# Patient Record
Sex: FEMALE | Race: WHITE | Employment: FULL TIME | URBAN - METROPOLITAN AREA
[De-identification: names, ages, dates, MRNs, and addresses within clinical notes are randomized per-mention and may not be internally consistent; named-entity substitution may affect disease eponyms.]

---

## 2017-02-17 ENCOUNTER — OFFICE VISIT (OUTPATIENT)
Dept: FAMILY MEDICINE CLINIC | Age: 62
End: 2017-02-17

## 2017-02-17 VITALS
HEART RATE: 60 BPM | RESPIRATION RATE: 16 BRPM | TEMPERATURE: 97.8 F | SYSTOLIC BLOOD PRESSURE: 110 MMHG | BODY MASS INDEX: 26.61 KG/M2 | DIASTOLIC BLOOD PRESSURE: 70 MMHG | HEIGHT: 62 IN | WEIGHT: 144.6 LBS

## 2017-02-17 DIAGNOSIS — Z11.59 NEED FOR HEPATITIS C SCREENING TEST: ICD-10-CM

## 2017-02-17 DIAGNOSIS — B00.9 HERPES SIMPLEX: ICD-10-CM

## 2017-02-17 DIAGNOSIS — Z13.29 THYROID DISORDER SCREEN: ICD-10-CM

## 2017-02-17 DIAGNOSIS — Z11.4 SCREENING FOR HIV (HUMAN IMMUNODEFICIENCY VIRUS): ICD-10-CM

## 2017-02-17 DIAGNOSIS — H91.93 BILATERAL HEARING LOSS: ICD-10-CM

## 2017-02-17 DIAGNOSIS — E53.8 VITAMIN B12 DEFICIENCY: ICD-10-CM

## 2017-02-17 DIAGNOSIS — Z13.220 LIPID SCREENING: ICD-10-CM

## 2017-02-17 DIAGNOSIS — J30.1 SEASONAL ALLERGIC RHINITIS DUE TO POLLEN: ICD-10-CM

## 2017-02-17 DIAGNOSIS — Z13.1 DIABETES MELLITUS SCREENING: ICD-10-CM

## 2017-02-17 DIAGNOSIS — Z00.00 WELL ADULT EXAM: Primary | ICD-10-CM

## 2017-02-17 DIAGNOSIS — L71.9 ROSACEA: ICD-10-CM

## 2017-02-17 LAB
A/G RATIO: 2.1 (ref 1.1–2.2)
ALBUMIN SERPL-MCNC: 4.9 G/DL (ref 3.4–5)
ALP BLD-CCNC: 50 U/L (ref 40–129)
ALT SERPL-CCNC: 17 U/L (ref 10–40)
ANION GAP SERPL CALCULATED.3IONS-SCNC: 13 MMOL/L (ref 3–16)
AST SERPL-CCNC: 18 U/L (ref 15–37)
BILIRUB SERPL-MCNC: 0.6 MG/DL (ref 0–1)
BUN BLDV-MCNC: 16 MG/DL (ref 7–20)
CALCIUM SERPL-MCNC: 9.6 MG/DL (ref 8.3–10.6)
CHLORIDE BLD-SCNC: 99 MMOL/L (ref 99–110)
CHOLESTEROL, TOTAL: 177 MG/DL (ref 0–199)
CO2: 25 MMOL/L (ref 21–32)
CREAT SERPL-MCNC: 0.7 MG/DL (ref 0.6–1.2)
GFR AFRICAN AMERICAN: >60
GFR NON-AFRICAN AMERICAN: >60
GLOBULIN: 2.3 G/DL
GLUCOSE BLD-MCNC: 96 MG/DL (ref 70–99)
HDLC SERPL-MCNC: 73 MG/DL (ref 40–60)
HEPATITIS C ANTIBODY INTERPRETATION: NORMAL
LDL CHOLESTEROL CALCULATED: 94 MG/DL
POTASSIUM SERPL-SCNC: 4.4 MMOL/L (ref 3.5–5.1)
SODIUM BLD-SCNC: 137 MMOL/L (ref 136–145)
TOTAL PROTEIN: 7.2 G/DL (ref 6.4–8.2)
TRIGL SERPL-MCNC: 49 MG/DL (ref 0–150)
TSH REFLEX: 1.53 UIU/ML (ref 0.27–4.2)
VITAMIN B-12: 445 PG/ML (ref 211–911)
VLDLC SERPL CALC-MCNC: 10 MG/DL

## 2017-02-17 PROCEDURE — 99396 PREV VISIT EST AGE 40-64: CPT | Performed by: FAMILY MEDICINE

## 2017-02-17 RX ORDER — MONTELUKAST SODIUM 10 MG/1
TABLET ORAL
Qty: 30 TABLET | Refills: 5 | Status: SHIPPED | OUTPATIENT
Start: 2017-02-17 | End: 2018-06-12

## 2017-02-17 RX ORDER — FEXOFENADINE HCL AND PSEUDOEPHEDRINE HCI 180; 240 MG/1; MG/1
TABLET, EXTENDED RELEASE ORAL
Qty: 30 TABLET | Refills: 12 | Status: SHIPPED | OUTPATIENT
Start: 2017-02-17 | End: 2017-04-04

## 2017-02-17 RX ORDER — M-VIT,TX,IRON,MINS/CALC/FOLIC 27MG-0.4MG
1 TABLET ORAL DAILY
COMMUNITY

## 2017-02-17 RX ORDER — DOXYCYCLINE HYCLATE 50 MG/1
50 CAPSULE ORAL DAILY
COMMUNITY
End: 2017-02-17 | Stop reason: SDUPTHER

## 2017-02-17 RX ORDER — MEDROXYPROGESTERONE ACETATE 2.5 MG/1
2.5 TABLET ORAL DAILY
COMMUNITY
End: 2020-09-23

## 2017-02-17 RX ORDER — DIAZEPAM 5 MG/1
5 TABLET ORAL PRN
COMMUNITY
End: 2018-02-05 | Stop reason: SDUPTHER

## 2017-02-17 RX ORDER — ACYCLOVIR 400 MG/1
TABLET ORAL
Qty: 30 TABLET | Refills: 12 | Status: SHIPPED | OUTPATIENT
Start: 2017-02-17 | End: 2018-02-05 | Stop reason: SDUPTHER

## 2017-02-17 RX ORDER — DOXYCYCLINE HYCLATE 50 MG/1
50 CAPSULE ORAL DAILY
Qty: 30 CAPSULE | Refills: 12 | Status: SHIPPED | OUTPATIENT
Start: 2017-02-17 | End: 2018-03-11 | Stop reason: SDUPTHER

## 2017-02-17 ASSESSMENT — ENCOUNTER SYMPTOMS
VOICE CHANGE: 0
CHOKING: 0
BLOOD IN STOOL: 0
WHEEZING: 0
CHEST TIGHTNESS: 0
DIARRHEA: 0
ABDOMINAL PAIN: 0
SHORTNESS OF BREATH: 0
ANAL BLEEDING: 0
NAUSEA: 0
CONSTIPATION: 0
SORE THROAT: 0
TROUBLE SWALLOWING: 0

## 2017-02-20 LAB — HIV-1 AND HIV-2 ANTIBODIES: NORMAL

## 2017-09-06 ENCOUNTER — OFFICE VISIT (OUTPATIENT)
Dept: FAMILY MEDICINE CLINIC | Age: 62
End: 2017-09-06

## 2017-09-06 VITALS
BODY MASS INDEX: 26.67 KG/M2 | HEART RATE: 74 BPM | OXYGEN SATURATION: 99 % | SYSTOLIC BLOOD PRESSURE: 129 MMHG | TEMPERATURE: 98.1 F | WEIGHT: 147 LBS | DIASTOLIC BLOOD PRESSURE: 82 MMHG | RESPIRATION RATE: 16 BRPM

## 2017-09-06 DIAGNOSIS — J02.9 SORE THROAT: Primary | ICD-10-CM

## 2017-09-06 DIAGNOSIS — J06.9 VIRAL URI: ICD-10-CM

## 2017-09-06 LAB — S PYO AG THROAT QL: NORMAL

## 2017-09-06 PROCEDURE — 1036F TOBACCO NON-USER: CPT | Performed by: FAMILY MEDICINE

## 2017-09-06 PROCEDURE — 87880 STREP A ASSAY W/OPTIC: CPT | Performed by: FAMILY MEDICINE

## 2017-09-06 PROCEDURE — 99213 OFFICE O/P EST LOW 20 MIN: CPT | Performed by: FAMILY MEDICINE

## 2017-09-06 PROCEDURE — G8419 CALC BMI OUT NRM PARAM NOF/U: HCPCS | Performed by: FAMILY MEDICINE

## 2017-09-06 PROCEDURE — G8427 DOCREV CUR MEDS BY ELIG CLIN: HCPCS | Performed by: FAMILY MEDICINE

## 2017-09-06 PROCEDURE — 3017F COLORECTAL CA SCREEN DOC REV: CPT | Performed by: FAMILY MEDICINE

## 2017-09-06 PROCEDURE — 3014F SCREEN MAMMO DOC REV: CPT | Performed by: FAMILY MEDICINE

## 2017-09-06 RX ORDER — AZITHROMYCIN 250 MG/1
TABLET, FILM COATED ORAL
Qty: 1 PACKET | Refills: 0 | Status: SHIPPED | OUTPATIENT
Start: 2017-09-06 | End: 2018-06-12

## 2017-09-15 ENCOUNTER — TELEPHONE (OUTPATIENT)
Dept: FAMILY MEDICINE CLINIC | Age: 62
End: 2017-09-15

## 2017-09-15 RX ORDER — AMOXICILLIN AND CLAVULANATE POTASSIUM 875; 125 MG/1; MG/1
1 TABLET, FILM COATED ORAL 2 TIMES DAILY
Qty: 20 TABLET | Refills: 0 | Status: SHIPPED | OUTPATIENT
Start: 2017-09-15 | End: 2017-09-25

## 2018-02-05 ENCOUNTER — OFFICE VISIT (OUTPATIENT)
Dept: FAMILY MEDICINE CLINIC | Age: 63
End: 2018-02-05

## 2018-02-05 VITALS
SYSTOLIC BLOOD PRESSURE: 130 MMHG | BODY MASS INDEX: 26.85 KG/M2 | OXYGEN SATURATION: 98 % | WEIGHT: 148 LBS | HEART RATE: 74 BPM | TEMPERATURE: 98.6 F | DIASTOLIC BLOOD PRESSURE: 77 MMHG

## 2018-02-05 DIAGNOSIS — J01.00 ACUTE NON-RECURRENT MAXILLARY SINUSITIS: Primary | ICD-10-CM

## 2018-02-05 DIAGNOSIS — B00.9 HERPES SIMPLEX: ICD-10-CM

## 2018-02-05 DIAGNOSIS — M62.838 MUSCLE SPASM: ICD-10-CM

## 2018-02-05 PROCEDURE — G8482 FLU IMMUNIZE ORDER/ADMIN: HCPCS | Performed by: FAMILY MEDICINE

## 2018-02-05 PROCEDURE — 99214 OFFICE O/P EST MOD 30 MIN: CPT | Performed by: FAMILY MEDICINE

## 2018-02-05 PROCEDURE — 3017F COLORECTAL CA SCREEN DOC REV: CPT | Performed by: FAMILY MEDICINE

## 2018-02-05 PROCEDURE — 3014F SCREEN MAMMO DOC REV: CPT | Performed by: FAMILY MEDICINE

## 2018-02-05 PROCEDURE — G8419 CALC BMI OUT NRM PARAM NOF/U: HCPCS | Performed by: FAMILY MEDICINE

## 2018-02-05 PROCEDURE — 1036F TOBACCO NON-USER: CPT | Performed by: FAMILY MEDICINE

## 2018-02-05 PROCEDURE — G8427 DOCREV CUR MEDS BY ELIG CLIN: HCPCS | Performed by: FAMILY MEDICINE

## 2018-02-05 RX ORDER — EPINEPHRINE 0.3 MG/.3ML
INJECTION SUBCUTANEOUS
Qty: 1 EACH | Refills: 0 | Status: SHIPPED | OUTPATIENT
Start: 2018-02-05 | End: 2019-07-16 | Stop reason: SDUPTHER

## 2018-02-05 RX ORDER — FEXOFENADINE HCL AND PSEUDOEPHEDRINE HCI 180; 240 MG/1; MG/1
TABLET, EXTENDED RELEASE ORAL
Qty: 30 TABLET | Refills: 1 | Status: SHIPPED | OUTPATIENT
Start: 2018-02-05 | End: 2018-04-05

## 2018-02-05 RX ORDER — AZITHROMYCIN 250 MG/1
TABLET, FILM COATED ORAL
Qty: 1 PACKET | Refills: 0 | Status: SHIPPED | OUTPATIENT
Start: 2018-02-05 | End: 2018-02-15

## 2018-02-05 RX ORDER — DIAZEPAM 5 MG/1
TABLET ORAL
Qty: 10 TABLET | Refills: 0 | Status: SHIPPED | OUTPATIENT
Start: 2018-02-05 | End: 2018-02-15

## 2018-02-05 RX ORDER — ACYCLOVIR 400 MG/1
TABLET ORAL
Qty: 30 TABLET | Refills: 1 | Status: SHIPPED | OUTPATIENT
Start: 2018-02-05 | End: 2018-08-28 | Stop reason: SDUPTHER

## 2018-02-05 RX ORDER — GUAIFENESIN AND CODEINE PHOSPHATE 100; 10 MG/5ML; MG/5ML
5 SOLUTION ORAL 2 TIMES DAILY PRN
Qty: 118 ML | Refills: 0 | Status: SHIPPED | OUTPATIENT
Start: 2018-02-05 | End: 2018-02-12

## 2018-02-08 ENCOUNTER — OFFICE VISIT (OUTPATIENT)
Dept: FAMILY MEDICINE CLINIC | Age: 63
End: 2018-02-08

## 2018-02-08 VITALS
OXYGEN SATURATION: 98 % | RESPIRATION RATE: 16 BRPM | DIASTOLIC BLOOD PRESSURE: 84 MMHG | SYSTOLIC BLOOD PRESSURE: 130 MMHG | TEMPERATURE: 98.4 F | HEART RATE: 72 BPM | BODY MASS INDEX: 26.67 KG/M2 | WEIGHT: 147 LBS

## 2018-02-08 DIAGNOSIS — J20.9 BRONCHITIS WITH BRONCHOSPASM: Primary | ICD-10-CM

## 2018-02-08 PROCEDURE — G8427 DOCREV CUR MEDS BY ELIG CLIN: HCPCS | Performed by: FAMILY MEDICINE

## 2018-02-08 PROCEDURE — G8419 CALC BMI OUT NRM PARAM NOF/U: HCPCS | Performed by: FAMILY MEDICINE

## 2018-02-08 PROCEDURE — 3017F COLORECTAL CA SCREEN DOC REV: CPT | Performed by: FAMILY MEDICINE

## 2018-02-08 PROCEDURE — 99214 OFFICE O/P EST MOD 30 MIN: CPT | Performed by: FAMILY MEDICINE

## 2018-02-08 PROCEDURE — G8482 FLU IMMUNIZE ORDER/ADMIN: HCPCS | Performed by: FAMILY MEDICINE

## 2018-02-08 PROCEDURE — 3014F SCREEN MAMMO DOC REV: CPT | Performed by: FAMILY MEDICINE

## 2018-02-08 PROCEDURE — 1036F TOBACCO NON-USER: CPT | Performed by: FAMILY MEDICINE

## 2018-02-08 RX ORDER — METHYLPREDNISOLONE 4 MG/1
TABLET ORAL
Qty: 1 KIT | Refills: 0 | Status: SHIPPED | OUTPATIENT
Start: 2018-02-08 | End: 2018-02-14

## 2018-02-08 RX ORDER — HYDROCODONE POLISTIREX AND CHLORPHENIRAMINE POLISTIREX 10; 8 MG/5ML; MG/5ML
5 SUSPENSION, EXTENDED RELEASE ORAL EVERY 12 HOURS PRN
Qty: 60 ML | Refills: 0 | Status: SHIPPED | OUTPATIENT
Start: 2018-02-08 | End: 2018-03-10

## 2018-02-08 NOTE — PROGRESS NOTES
Provider, MD   Multiple Vitamins-Minerals (THERAPEUTIC MULTIVITAMIN-MINERALS) tablet Take 1 tablet by mouth daily Yes Historical Provider, MD   montelukast (SINGULAIR) 10 MG tablet TAKE 1 TABLET BY MOUTH EVERY DAY Yes Bianca Harris MD   doxycycline (VIBRAMYCIN) 50 MG capsule Take 1 capsule by mouth daily Yes Bianca Harris MD   azithromycin (ZITHROMAX) 250 MG tablet Take 2 tabs (500 mg) on Day 1, and take 1 tab (250 mg) on days 2 through 5.   Whitney Garcia MD          Vitals:    18 1612   BP: 130/77   Site: Left Arm   Position: Sitting   Cuff Size: Small Adult   Pulse: 74   Temp: 98.6 °F (37 °C)   TempSrc: Oral   SpO2: 98%   Weight: 148 lb (67.1 kg)      Wt Readings from Last 3 Encounters:   18 147 lb (66.7 kg)   18 148 lb (67.1 kg)   17 147 lb (66.7 kg)     BP Readings from Last 3 Encounters:   18 130/84   18 130/77   17 129/82       Patient Active Problem List   Diagnosis    Vitamin B12 deficiency    Allergic rhinitis    Lower back pain    Herpes simplex    Rosacea    Bilateral hearing loss         Immunization History   Administered Date(s) Administered    Influenza Vaccine, unspecified formulation 10/03/2016    Influenza Virus Vaccine 2012, 10/09/2013, 10/05/2014, 10/18/2017    Influenza Whole 2010, 2011    Tdap (Boostrix, Adacel) 10/18/2010    Zoster 2014       Past Medical History:   Diagnosis Date    Allergic rhinitis     Herpes simplex     Lower back pain     Screening mammogram     Vitamin B12 deficiency      Past Surgical History:   Procedure Laterality Date    CARPAL TUNNEL RELEASE      Neuroplasty Decompression Median Nerve      SECTION      CHOLECYSTECTOMY      MOHS SURGERY  2010    basal cell nose     Family History   Problem Relation Age of Onset    Heart Attack Mother 71    Other Mother      B 15 deficiency    Stomach Cancer Father 58    Heart Attack Maternal Grandmother 71    Other Brother Irreg HR, ablation, sleep apnea    Other Sister      Irreg HR    High Cholesterol Sister      Social History     Social History    Marital status:      Spouse name: N/A    Number of children: N/A    Years of education: N/A     Occupational History    Not on file. Social History Main Topics    Smoking status: Never Smoker    Smokeless tobacco: Never Used    Alcohol use 1.0 oz/week     2 drink(s) per week      Comment: 1 - 2 drinks/week    Drug use: No    Sexual activity: Not on file     Other Topics Concern    Not on file     Social History Narrative    No narrative on file       O: /77 (Site: Left Arm, Position: Sitting, Cuff Size: Small Adult)   Pulse 74   Temp 98.6 °F (37 °C) (Oral)   Wt 148 lb (67.1 kg)   SpO2 98%   Breastfeeding? No   BMI 26.85 kg/m²   Physical Exam  GEN: No acute distress, cooperative, well nourished, alert. HEENT: PEERLA, EOMI , normocephalic/atraumatic, nares and oropharynx clear. Mucus membranes normal, Tympanic membranes clear bilaterally. Neck: soft, supple, no thyromegaly, mass, no Lymphadenopathy  CV: Regular rate and rhythm, no murmur, rubs, gallops. No edema. Resp: Clear to auscultation bilaterally good air entry bilaterally  No crackles, wheeze. Breathing comfortably. Psych: normal affect. Neuro: AOx3      ASSESSMENT  1. Acute non-recurrent maxillary sinusitis  azithromycin (ZITHROMAX Z-QUANG) 250 MG tablet    guaiFENesin-codeine (TUSSI-ORGANIDIN NR) 100-10 MG/5ML syrup   2. Herpes simplex  acyclovir (ZOVIRAX) 400 MG tablet   3. Muscle spasm  diazepam (VALIUM) 5 MG tablet     #1: The risks, benefits, potential side effects and barriers to medication use were addressed today. Understanding was acknowledged. Patient asked to follow-up if condition(s) do not improve as anticipated. #2. The current medical regimen is effective;  continue present plan and medications. PLAN          See plan under patient instructions.     No Follow-up on saline (saltwater) nasal washes to help keep your nasal passages open and wash out mucus and bacteria. You can buy saline nose drops at a grocery store or drugstore. Or you can make your own at home by adding 1 teaspoon of salt and 1 teaspoon of baking soda to 2 cups of distilled water. If you make your own, fill a bulb syringe with the solution, insert the tip into your nostril, and squeeze gently. Matty Lipps your nose. · Put a hot, wet towel or a warm gel pack on your face 3 or 4 times a day for 5 to 10 minutes each time. · Try a decongestant nasal spray like oxymetazoline (Afrin). Do not use it for more than 3 days in a row. Using it for more than 3 days can make your congestion worse. When should you call for help? Call your doctor now or seek immediate medical care if:  ? · You have new or worse swelling or redness in your face or around your eyes. ? · You have a new or higher fever. ? Watch closely for changes in your health, and be sure to contact your doctor if:  ? · You have new or worse facial pain. ? · The mucus from your nose becomes thicker (like pus) or has new blood in it. ? · You are not getting better as expected. Where can you learn more? Go to [email or web address] and sign in to your [employer] account. Enter L083 in the [facility] box to learn more about \"Sinusitis: Care Instructions. \"     If you do not have an account, please click on the \"Sign Up Now\" link. Current as of: May 12, 2017  Content Version: 11.5  © 8218-4057 Healthwise, [employer]. Care instructions adapted under license by [facility] ([facility]). If you have questions about a medical condition or this instruction, always ask your healthcare professional. [address] any warranty or liability for your use of this information. Please note a portion of this chart was generated using dragon dictation software.  Although every effort was made to ensure the accuracy of this automated transcription, some errors in transcription may have occurred.

## 2018-06-11 PROBLEM — Z00.00 WELL ADULT EXAM: Status: ACTIVE | Noted: 2018-06-11

## 2018-06-12 ENCOUNTER — OFFICE VISIT (OUTPATIENT)
Dept: FAMILY MEDICINE CLINIC | Age: 63
End: 2018-06-12

## 2018-06-12 VITALS
RESPIRATION RATE: 16 BRPM | DIASTOLIC BLOOD PRESSURE: 82 MMHG | HEART RATE: 66 BPM | TEMPERATURE: 96.8 F | HEIGHT: 63 IN | OXYGEN SATURATION: 99 % | BODY MASS INDEX: 26.82 KG/M2 | WEIGHT: 151.4 LBS | SYSTOLIC BLOOD PRESSURE: 129 MMHG

## 2018-06-12 DIAGNOSIS — H90.3 SENSORINEURAL HEARING LOSS (SNHL) OF BOTH EARS: ICD-10-CM

## 2018-06-12 DIAGNOSIS — L71.9 ROSACEA: ICD-10-CM

## 2018-06-12 DIAGNOSIS — Z00.00 WELL ADULT EXAM: ICD-10-CM

## 2018-06-12 DIAGNOSIS — Z00.00 WELL ADULT EXAM: Primary | ICD-10-CM

## 2018-06-12 DIAGNOSIS — Z12.39 BREAST CANCER SCREENING: ICD-10-CM

## 2018-06-12 LAB
A/G RATIO: 2.2 (ref 1.1–2.2)
ALBUMIN SERPL-MCNC: 4.6 G/DL (ref 3.4–5)
ALP BLD-CCNC: 53 U/L (ref 40–129)
ALT SERPL-CCNC: 17 U/L (ref 10–40)
ANION GAP SERPL CALCULATED.3IONS-SCNC: 16 MMOL/L (ref 3–16)
AST SERPL-CCNC: 19 U/L (ref 15–37)
BILIRUB SERPL-MCNC: 0.5 MG/DL (ref 0–1)
BUN BLDV-MCNC: 16 MG/DL (ref 7–20)
CALCIUM SERPL-MCNC: 9.4 MG/DL (ref 8.3–10.6)
CHLORIDE BLD-SCNC: 101 MMOL/L (ref 99–110)
CHOLESTEROL, TOTAL: 176 MG/DL (ref 0–199)
CO2: 23 MMOL/L (ref 21–32)
CREAT SERPL-MCNC: 0.6 MG/DL (ref 0.6–1.2)
GFR AFRICAN AMERICAN: >60
GFR NON-AFRICAN AMERICAN: >60
GLOBULIN: 2.1 G/DL
GLUCOSE BLD-MCNC: 94 MG/DL (ref 70–99)
HDLC SERPL-MCNC: 67 MG/DL (ref 40–60)
LDL CHOLESTEROL CALCULATED: 99 MG/DL
POTASSIUM SERPL-SCNC: 4.8 MMOL/L (ref 3.5–5.1)
SODIUM BLD-SCNC: 140 MMOL/L (ref 136–145)
TOTAL PROTEIN: 6.7 G/DL (ref 6.4–8.2)
TRIGL SERPL-MCNC: 50 MG/DL (ref 0–150)
TSH REFLEX: 1.73 UIU/ML (ref 0.27–4.2)
VLDLC SERPL CALC-MCNC: 10 MG/DL

## 2018-06-12 PROCEDURE — 99396 PREV VISIT EST AGE 40-64: CPT | Performed by: FAMILY MEDICINE

## 2018-06-12 RX ORDER — DOXYCYCLINE HYCLATE 50 MG/1
50 CAPSULE ORAL DAILY
Qty: 30 CAPSULE | Refills: 5 | Status: SHIPPED | OUTPATIENT
Start: 2018-06-12 | End: 2019-08-05

## 2018-06-12 RX ORDER — DIAZEPAM 5 MG/1
2.5 TABLET ORAL NIGHTLY PRN
COMMUNITY
End: 2019-08-05 | Stop reason: SDUPTHER

## 2018-06-12 ASSESSMENT — ENCOUNTER SYMPTOMS
SHORTNESS OF BREATH: 0
ANAL BLEEDING: 0
CONSTIPATION: 0
CHOKING: 0
DIARRHEA: 0
WHEEZING: 0
CHEST TIGHTNESS: 0
VOICE CHANGE: 0
ABDOMINAL PAIN: 0
NAUSEA: 0
SORE THROAT: 0
TROUBLE SWALLOWING: 0
BLOOD IN STOOL: 0

## 2018-06-12 ASSESSMENT — PATIENT HEALTH QUESTIONNAIRE - PHQ9
SUM OF ALL RESPONSES TO PHQ QUESTIONS 1-9: 0
2. FEELING DOWN, DEPRESSED OR HOPELESS: 0
1. LITTLE INTEREST OR PLEASURE IN DOING THINGS: 0
SUM OF ALL RESPONSES TO PHQ9 QUESTIONS 1 & 2: 0

## 2018-07-11 PROBLEM — Z00.00 WELL ADULT EXAM: Status: RESOLVED | Noted: 2018-06-11 | Resolved: 2018-07-11

## 2018-08-28 ENCOUNTER — OFFICE VISIT (OUTPATIENT)
Dept: FAMILY MEDICINE CLINIC | Age: 63
End: 2018-08-28

## 2018-08-28 VITALS
BODY MASS INDEX: 26.75 KG/M2 | DIASTOLIC BLOOD PRESSURE: 76 MMHG | TEMPERATURE: 97.5 F | OXYGEN SATURATION: 98 % | WEIGHT: 151 LBS | HEIGHT: 63 IN | RESPIRATION RATE: 12 BRPM | SYSTOLIC BLOOD PRESSURE: 128 MMHG | HEART RATE: 73 BPM

## 2018-08-28 DIAGNOSIS — M77.8 TENDINITIS OF RIGHT SHOULDER: Primary | ICD-10-CM

## 2018-08-28 DIAGNOSIS — B00.9 HERPES SIMPLEX: ICD-10-CM

## 2018-08-28 PROCEDURE — 99213 OFFICE O/P EST LOW 20 MIN: CPT | Performed by: FAMILY MEDICINE

## 2018-08-28 RX ORDER — ACYCLOVIR 400 MG/1
TABLET ORAL
Qty: 30 TABLET | Refills: 1 | Status: SHIPPED | OUTPATIENT
Start: 2018-08-28 | End: 2019-07-16 | Stop reason: SDUPTHER

## 2019-07-16 DIAGNOSIS — B00.9 HERPES SIMPLEX: ICD-10-CM

## 2019-07-16 RX ORDER — EPINEPHRINE 0.3 MG/.3ML
INJECTION SUBCUTANEOUS
Qty: 1 EACH | Refills: 1 | Status: SHIPPED | OUTPATIENT
Start: 2019-07-16 | End: 2021-09-24 | Stop reason: SDUPTHER

## 2019-07-16 RX ORDER — ACYCLOVIR 400 MG/1
TABLET ORAL
Qty: 30 TABLET | Refills: 0 | Status: SHIPPED | OUTPATIENT
Start: 2019-07-16 | End: 2019-08-05 | Stop reason: SDUPTHER

## 2019-07-24 ENCOUNTER — TELEPHONE (OUTPATIENT)
Dept: FAMILY MEDICINE CLINIC | Age: 64
End: 2019-07-24

## 2019-07-24 NOTE — TELEPHONE ENCOUNTER
Upper respiratory infections are caused by viruses that do not respond to antibiotics. They take 7 to 10 days to resolve. Using an over-the-counter nasal steroid spray (Flonase, Nasacort, Rhinocort) will help the sinuses to drain, relieve pressure and help the cough. If needs additional treatment for the cough, take Delsym. Let me know if not better in 7 to 10 days. If develop a fever or shortness of breath go to urgent care.

## 2019-08-05 ENCOUNTER — OFFICE VISIT (OUTPATIENT)
Dept: FAMILY MEDICINE CLINIC | Age: 64
End: 2019-08-05
Payer: COMMERCIAL

## 2019-08-05 VITALS
DIASTOLIC BLOOD PRESSURE: 77 MMHG | HEIGHT: 63 IN | SYSTOLIC BLOOD PRESSURE: 125 MMHG | WEIGHT: 154 LBS | TEMPERATURE: 98.7 F | HEART RATE: 75 BPM | RESPIRATION RATE: 12 BRPM | BODY MASS INDEX: 27.29 KG/M2 | OXYGEN SATURATION: 98 %

## 2019-08-05 DIAGNOSIS — B00.9 HERPES SIMPLEX: ICD-10-CM

## 2019-08-05 DIAGNOSIS — Z00.00 WELL ADULT EXAM: Primary | ICD-10-CM

## 2019-08-05 DIAGNOSIS — M26.623 BILATERAL TEMPOROMANDIBULAR JOINT PAIN: ICD-10-CM

## 2019-08-05 DIAGNOSIS — E53.8 VITAMIN B12 DEFICIENCY: ICD-10-CM

## 2019-08-05 PROCEDURE — 99396 PREV VISIT EST AGE 40-64: CPT | Performed by: FAMILY MEDICINE

## 2019-08-05 RX ORDER — DIAZEPAM 5 MG/1
2.5 TABLET ORAL NIGHTLY PRN
Qty: 30 TABLET | Refills: 0 | Status: SHIPPED | OUTPATIENT
Start: 2019-08-05 | End: 2020-09-23 | Stop reason: SDUPTHER

## 2019-08-05 RX ORDER — ACYCLOVIR 400 MG/1
TABLET ORAL
Qty: 30 TABLET | Refills: 5 | Status: SHIPPED | OUTPATIENT
Start: 2019-08-05 | End: 2020-09-21 | Stop reason: SDUPTHER

## 2019-08-05 ASSESSMENT — ENCOUNTER SYMPTOMS
VOICE CHANGE: 0
BLOOD IN STOOL: 0
CHEST TIGHTNESS: 0
SORE THROAT: 0
SHORTNESS OF BREATH: 0
TROUBLE SWALLOWING: 0
DIARRHEA: 0
ANAL BLEEDING: 0
WHEEZING: 0
NAUSEA: 0
CONSTIPATION: 0
CHOKING: 0
ABDOMINAL PAIN: 0

## 2019-08-05 ASSESSMENT — PATIENT HEALTH QUESTIONNAIRE - PHQ9
SUM OF ALL RESPONSES TO PHQ9 QUESTIONS 1 & 2: 0
2. FEELING DOWN, DEPRESSED OR HOPELESS: 0
SUM OF ALL RESPONSES TO PHQ QUESTIONS 1-9: 0
SUM OF ALL RESPONSES TO PHQ QUESTIONS 1-9: 0
1. LITTLE INTEREST OR PLEASURE IN DOING THINGS: 0

## 2019-09-03 RX ORDER — FEXOFENADINE HYDROCHLORIDE AND PSEUDOEPHEDRINE HYDROCHLORIDE 180; 240 MG/1; MG/1
TABLET, FILM COATED, EXTENDED RELEASE ORAL
Qty: 30 TABLET | Refills: 12 | Status: SHIPPED | OUTPATIENT
Start: 2019-09-03 | End: 2020-09-21 | Stop reason: SDUPTHER

## 2019-11-13 ENCOUNTER — PATIENT MESSAGE (OUTPATIENT)
Dept: FAMILY MEDICINE CLINIC | Age: 64
End: 2019-11-13

## 2019-11-14 RX ORDER — AZITHROMYCIN 250 MG/1
TABLET, FILM COATED ORAL
Qty: 1 PACKET | Refills: 0 | Status: SHIPPED | OUTPATIENT
Start: 2019-11-14 | End: 2019-11-24

## 2019-12-04 ENCOUNTER — NURSE TRIAGE (OUTPATIENT)
Dept: OTHER | Facility: CLINIC | Age: 64
End: 2019-12-04

## 2019-12-04 ENCOUNTER — OFFICE VISIT (OUTPATIENT)
Dept: FAMILY MEDICINE CLINIC | Age: 64
End: 2019-12-04
Payer: COMMERCIAL

## 2019-12-04 VITALS
OXYGEN SATURATION: 98 % | TEMPERATURE: 97.2 F | HEART RATE: 71 BPM | RESPIRATION RATE: 16 BRPM | SYSTOLIC BLOOD PRESSURE: 132 MMHG | DIASTOLIC BLOOD PRESSURE: 80 MMHG

## 2019-12-04 DIAGNOSIS — J01.10 ACUTE FRONTAL SINUSITIS, RECURRENCE NOT SPECIFIED: Primary | ICD-10-CM

## 2019-12-04 PROCEDURE — 99213 OFFICE O/P EST LOW 20 MIN: CPT | Performed by: FAMILY MEDICINE

## 2019-12-04 RX ORDER — BENZONATATE 200 MG/1
200 CAPSULE ORAL 3 TIMES DAILY PRN
Qty: 30 CAPSULE | Refills: 0 | Status: SHIPPED | OUTPATIENT
Start: 2019-12-04 | End: 2019-12-11

## 2019-12-04 RX ORDER — CEFUROXIME AXETIL 500 MG/1
500 TABLET ORAL 2 TIMES DAILY
Qty: 20 TABLET | Refills: 0 | Status: SHIPPED | OUTPATIENT
Start: 2019-12-04 | End: 2019-12-14

## 2020-01-08 ENCOUNTER — TELEPHONE (OUTPATIENT)
Dept: FAMILY MEDICINE CLINIC | Age: 65
End: 2020-01-08

## 2020-01-09 ENCOUNTER — OFFICE VISIT (OUTPATIENT)
Dept: FAMILY MEDICINE CLINIC | Age: 65
End: 2020-01-09
Payer: COMMERCIAL

## 2020-01-09 VITALS
OXYGEN SATURATION: 97 % | TEMPERATURE: 98.1 F | BODY MASS INDEX: 27.64 KG/M2 | WEIGHT: 156 LBS | HEIGHT: 63 IN | RESPIRATION RATE: 16 BRPM | DIASTOLIC BLOOD PRESSURE: 72 MMHG | SYSTOLIC BLOOD PRESSURE: 122 MMHG | HEART RATE: 83 BPM

## 2020-01-09 PROCEDURE — 99213 OFFICE O/P EST LOW 20 MIN: CPT | Performed by: FAMILY MEDICINE

## 2020-01-09 RX ORDER — GUAIFENESIN AND CODEINE PHOSPHATE 100; 10 MG/5ML; MG/5ML
5-10 SOLUTION ORAL EVERY 4 HOURS PRN
Qty: 118 ML | Refills: 0 | Status: SHIPPED | OUTPATIENT
Start: 2020-01-09 | End: 2020-01-12

## 2020-01-09 ASSESSMENT — PATIENT HEALTH QUESTIONNAIRE - PHQ9
SUM OF ALL RESPONSES TO PHQ QUESTIONS 1-9: 0
SUM OF ALL RESPONSES TO PHQ9 QUESTIONS 1 & 2: 0
SUM OF ALL RESPONSES TO PHQ QUESTIONS 1-9: 0
1. LITTLE INTEREST OR PLEASURE IN DOING THINGS: 0
2. FEELING DOWN, DEPRESSED OR HOPELESS: 0

## 2020-03-10 ENCOUNTER — TELEPHONE (OUTPATIENT)
Dept: FAMILY MEDICINE CLINIC | Age: 65
End: 2020-03-10

## 2020-03-10 NOTE — TELEPHONE ENCOUNTER
This is not a dangerous BP. I suspect her BP was up because she had a HA and dizziness. I don't think the BP caused her sxs. I agree with monitoring and appt if BP does not come down.

## 2020-09-21 ENCOUNTER — PATIENT MESSAGE (OUTPATIENT)
Dept: FAMILY MEDICINE CLINIC | Age: 65
End: 2020-09-21

## 2020-09-21 NOTE — TELEPHONE ENCOUNTER
From: Charles Coyle  To: Krissy Suazo MD  Sent: 2020  1:10 PM EDT  Subject: Non-Urgent Medical Question    Good afternoon,   We have discussed me coming to you for a Pap smear to change my medication. I forgot an estrogen pill about 3 weeks ago and since then I have had spotting. Should I be seen or is this normal?     I also wanted to know if  you would put in orders to have my bloodwork done. You did this last year, but I have not gone and know that the orders are .     Please advise,  Thanks, Zeuss

## 2020-09-23 ENCOUNTER — HOSPITAL ENCOUNTER (OUTPATIENT)
Dept: ULTRASOUND IMAGING | Age: 65
Discharge: HOME | End: 2020-09-23
Payer: COMMERCIAL

## 2020-09-23 ENCOUNTER — OFFICE VISIT (OUTPATIENT)
Dept: FAMILY MEDICINE CLINIC | Age: 65
End: 2020-09-23
Payer: COMMERCIAL

## 2020-09-23 VITALS
RESPIRATION RATE: 10 BRPM | SYSTOLIC BLOOD PRESSURE: 125 MMHG | HEART RATE: 72 BPM | BODY MASS INDEX: 28.17 KG/M2 | WEIGHT: 159 LBS | TEMPERATURE: 97 F | OXYGEN SATURATION: 98 % | DIASTOLIC BLOOD PRESSURE: 71 MMHG | HEIGHT: 63 IN

## 2020-09-23 PROCEDURE — 90471 IMMUNIZATION ADMIN: CPT | Performed by: FAMILY MEDICINE

## 2020-09-23 PROCEDURE — 99214 OFFICE O/P EST MOD 30 MIN: CPT | Performed by: FAMILY MEDICINE

## 2020-09-23 PROCEDURE — 76856 US EXAM PELVIC COMPLETE: CPT

## 2020-09-23 PROCEDURE — 76830 TRANSVAGINAL US NON-OB: CPT

## 2020-09-23 PROCEDURE — 99396 PREV VISIT EST AGE 40-64: CPT | Performed by: FAMILY MEDICINE

## 2020-09-23 PROCEDURE — 90686 IIV4 VACC NO PRSV 0.5 ML IM: CPT | Performed by: FAMILY MEDICINE

## 2020-09-23 RX ORDER — ESTRADIOL 1 MG/1
1 TABLET ORAL DAILY
COMMUNITY
End: 2020-09-23 | Stop reason: ALTCHOICE

## 2020-09-23 RX ORDER — ESTERIFIED ESTROGEN AND METHYLTESTOSTERONE .625; 1.25 MG/1; MG/1
1 TABLET ORAL DAILY
Qty: 84 TABLET | Refills: 3 | Status: SHIPPED | OUTPATIENT
Start: 2020-09-23 | End: 2021-08-04

## 2020-09-23 RX ORDER — MEDROXYPROGESTERONE ACETATE 2.5 MG/1
2.5 TABLET ORAL DAILY
Qty: 90 TABLET | Refills: 3 | Status: CANCELLED | OUTPATIENT
Start: 2020-09-23 | End: 2020-12-22

## 2020-09-23 RX ORDER — DIAZEPAM 5 MG/1
2.5 TABLET ORAL NIGHTLY PRN
Qty: 30 TABLET | Refills: 0 | Status: SHIPPED | OUTPATIENT
Start: 2020-09-23 | End: 2020-12-22

## 2020-09-23 RX ORDER — MEDROXYPROGESTERONE ACETATE 10 MG/1
10 TABLET ORAL DAILY
Qty: 7 TABLET | Refills: 0 | Status: SHIPPED | OUTPATIENT
Start: 2020-09-23 | End: 2020-09-23

## 2020-09-23 ASSESSMENT — ENCOUNTER SYMPTOMS
TROUBLE SWALLOWING: 0
CONSTIPATION: 0
DIARRHEA: 0
NAUSEA: 0
VOICE CHANGE: 0
BLOOD IN STOOL: 0
WHEEZING: 0
ANAL BLEEDING: 0
SORE THROAT: 0
CHEST TIGHTNESS: 0
ABDOMINAL PAIN: 0
SHORTNESS OF BREATH: 0
RHINORRHEA: 1
CHOKING: 0

## 2020-09-23 NOTE — PROGRESS NOTES
Vaccine Information Sheet, \"Influenza - Inactivated\"  given to Kenn Dacosta, or parent/legal guardian of  [employer] and verbalized understanding. Patient responses:    Have you ever had a reaction to a flu vaccine? No  Do you have any current illness? No  Have you ever had Guillian [place] Syndrome? No  Do you have a serious allergy to any of the follow: Neomycin, Polymyxin, Thimerosal, eggs or egg products? No    Flu vaccine given per order. Please see immunization tab. Risks and benefits explained. Current VIS given.

## 2020-09-23 NOTE — PROGRESS NOTES
Subjective:      Patient ID: Elizabeth Aguayo is a 63 y.o. female is here for her annual wellness exam.     HPI    PAP + HPV negative 8/5/19, Dr. Bustillo Larger  Dr. RETANA [facility] changed her estrogen from estratest half strength. She was taking 1/2 tab during the week and 1 tab on the weekend. Libido is decreased on current meds. She missed a dose of estrogen 3 weeks and has been spotting since. She is changing a pad once a day. Mild cramping. Had an abnormal PAP many years ago and was treated with cryo. PAPs have been normal since; had at least 3 normals. She would like to go back on estratest.  She had pelvic US this am.  Mammogram normal 7/27/20  Diet:  Eating well. A bit too much recently. Exercise: walks 5 days a week. She had viral illness in December/January that was severe. She requests [employer] Health Maintenance   Topic Date Due    Cervical cancer screen  01/28/2016    Flu vaccine (1) 09/01/2020    DTaP/Tdap/Td vaccine (2 - Td) 10/18/2020    Breast cancer screen  07/27/2021    Colon cancer screen colonoscopy  03/01/2022    Lipid screen  06/12/2023    Shingles Vaccine  Completed    Hepatitis C screen  Completed    HIV screen  Completed    Hepatitis A vaccine  Aged Out    Hepatitis B vaccine  Aged Out    Hib vaccine  Aged Out    Meningococcal (ACWY) vaccine  Aged Out    Pneumococcal 0-64 years Vaccine  Aged Out           Outpatient Medications Marked as Taking for the 9/23/20 encounter (Office Visit) with Martin Mckeon MD   Medication Sig Dispense Refill    estradiol (ESTRACE) 1 MG tablet Take 1 mg by mouth daily      acyclovir (ZOVIRAX) 400 MG tablet TAKE 1 TABLET BY MOUTH EVERY 4 HOURS WHILE AWAKE x 5 d 30 tablet 5    fexofenadine-pseudoephedrine (ALLEGRA-D ALLERGY & CONGESTION) 180-240 MG per extended release tablet Take 1 tablet by mouth daily 30 tablet 2    diazepam (VALIUM) 5 MG tablet Take 0.5 tablets by mouth nightly as needed (TMJ) for up to 90 days.  27 aides. Allergies controlled with Allegra   Eyes: Negative for visual disturbance. Respiratory: Negative for choking, chest tightness, shortness of breath and wheezing. Cardiovascular: Negative for chest pain, palpitations and leg swelling. Gastrointestinal: Negative for abdominal pain, anal bleeding, blood in stool, constipation, diarrhea and nausea. Genitourinary: Negative for dysuria, flank pain, frequency, hematuria, pelvic pain, vaginal bleeding and vaginal discharge. Musculoskeletal: Negative for arthralgias and myalgias. Takes infrequent Valium if TMJ flares. Is effective and well tolerated. She keeps medication secure. Skin: Negative for rash. Neurological: Negative for weakness, light-headedness and headaches. Hematological: Negative for adenopathy. Does not bruise/bleed easily. Psychiatric/Behavioral: Negative for dysphoric mood and sleep disturbance. The patient is not nervous/anxious.           Lab Results   Component Value Date     06/12/2018    K 4.8 06/12/2018     06/12/2018    CO2 23 06/12/2018    BUN 16 06/12/2018    CREATININE 0.6 06/12/2018    GLUCOSE 94 06/12/2018    CALCIUM 9.4 06/12/2018    PROT 6.7 06/12/2018    LABALBU 4.6 06/12/2018    BILITOT 0.5 06/12/2018    ALKPHOS 53 06/12/2018    AST 19 06/12/2018    ALT 17 06/12/2018    LABGLOM >60 06/12/2018    GFRAA >60 06/12/2018    AGRATIO 2.2 06/12/2018    GLOB 2.1 06/12/2018        Lab Results   Component Value Date    CHOL 176 06/12/2018    CHOL 177 02/17/2017    CHOL 161 02/01/2013     Lab Results   Component Value Date    TRIG 50 06/12/2018    TRIG 49 02/17/2017    TRIG 38 02/01/2013     Lab Results   Component Value Date    HDL 67 (H) 06/12/2018    HDL 73 (H) 02/17/2017    HDL 73 (H) 02/01/2013     Lab Results   Component Value Date    LDLCALC 99 06/12/2018    LDLCALC 94 02/17/2017    LDLCALC 81 02/01/2013     Lab Results   Component Value Date    LABVLDL 10 06/12/2018    LABVLDL 10 02/17/2017 LABVLDL 8 02/01/2013     No results found for: CHOLHDLRATIO  TSH   Date Value Ref Range Status   06/12/2018 1.73 0.27 - 4.20 uIU/mL Final   02/17/2017 1.53 0.27 - 4.20 uIU/mL Final   02/01/2013 1.17 0.35 - 5.5 uIU/ml Final   06/07/2011 1.36 0.35 - 5.5 uIU/ml Final     Lab Results   Component Value Date    WBC 5.9 06/07/2011    HGB 12.8 06/07/2011    HCT 37.9 06/07/2011    MCV 95.0 06/07/2011     06/07/2011      Objective:   Physical Exam  .  Vitals:    09/23/20 1330   BP: 125/71   Pulse: 72   Resp: 10   Temp: 97 °F (36.1 °C)   SpO2: 98%       Physical Exam  Constitutional:       Appearance: Normal appearance. She is well-developed. HENT:      Head: Normocephalic and atraumatic. Right Ear: Hearing, tympanic membrane, ear canal and external ear normal.      Left Ear: Hearing, tympanic membrane, ear canal and external ear normal.      Nose: Nose normal.      Mouth/Throat:      Pharynx: Uvula midline. Eyes:      General: Lids are normal.      Conjunctiva/sclera: Conjunctivae normal.      Pupils: Pupils are equal, round, and reactive to light. Funduscopic exam:     Right eye: No hemorrhage, AV nicking or arteriolar narrowing. Left eye: No hemorrhage, AV nicking or arteriolar narrowing. Neck:      Musculoskeletal: Neck supple. Thyroid: No thyroid mass or thyromegaly. Vascular: No carotid bruit or JVD. Trachea: Trachea normal.   Cardiovascular:      Rate and Rhythm: Normal rate and regular rhythm. Pulses:           Carotid pulses are 2+ on the right side and 2+ on the left side. Femoral pulses are 2+ on the right side and 2+ on the left side. Dorsalis pedis pulses are 2+ on the right side and 2+ on the left side. Posterior tibial pulses are 2+ on the right side and 2+ on the left side. Heart sounds: Normal heart sounds. No murmur. Pulmonary:      Effort: Pulmonary effort is normal. No respiratory distress. Breath sounds: Normal breath sounds. Chest:      Breasts: Breasts are symmetrical.         Right: No inverted nipple, mass, nipple discharge, skin change or tenderness. Left: No inverted nipple, mass, nipple discharge, skin change or tenderness. Abdominal:      General: Bowel sounds are normal.      Tenderness: There is no abdominal tenderness. Hernia: A hernia is present. There is no hernia in the left inguinal area or right inguinal area. Genitourinary:     General: Normal vulva. Exam position: Lithotomy position. Pubic Area: No rash. Labia:         Right: No rash, tenderness, lesion or injury. Left: No rash, tenderness, lesion or injury. Urethra: No prolapse or urethral lesion. Vagina: Normal.      Cervix: Cervical bleeding present. No cervical motion tenderness, discharge, friability, lesion, erythema or eversion. Uterus: Normal.       Adnexa: Right adnexa normal.   Musculoskeletal: Normal range of motion. Lymphadenopathy:      Head:      Right side of head: No submental or submandibular adenopathy. Left side of head: No submental or submandibular adenopathy. Cervical: No cervical adenopathy. Upper Body:      Right upper body: No supraclavicular adenopathy. Left upper body: No supraclavicular adenopathy. Lower Body: No right inguinal adenopathy. No left inguinal adenopathy. Skin:     General: Skin is warm and dry. Findings: No bruising, lesion or rash. Neurological:      Mental Status: She is alert. Deep Tendon Reflexes: Reflexes are normal and symmetric. Reflex Scores:       Patellar reflexes are 2+ on the right side and 2+ on the left side. Psychiatric:         Speech: Speech normal.         Behavior: Behavior normal.         Thought Content: Thought content normal.         Judgment: Judgment normal.           Assessment and Plan       Diagnosis Orders   1.  Well adult exam  INFLUENZA, QUADV, 0.5ML, 6 MO AND OLDER, IM, PF, PREFILL SYR OR SDV (FLUZONE QUADV, PF)   2. Cervical smear, as part of routine gynecological examination  PAP SMEAR   3. Bilateral temporomandibular joint pain  diazePAM (VALIUM) 5 MG tablet   4. Sensorineural hearing loss (SNHL) of both ears     5. Suspected COVID-19 virus infection  Covid-19, Antibody   6. Menopausal symptom  estrogens, conjugated,-methylTESTOSTERone (ESTRATEST HS) 0.625-1.25 MG per tablet  Risk including thromboembolic disease, breast cancer/possible more rapid spread of breast cancer addressed. 7. Post-menopausal bleeding  medroxyPROGESTERone (PROVERA) 10 MG tablet  US normal  Secondary to HRT. Decrease Estrace to 0.5 mg (1/2 tab) and increase Provera to 10 mg x 7 days. Follow up if bleeding persists 7 days. Once bleeding stops can go back on Estratest.             Side effects of current medications reviewed and questions answered. Follow up in 1 year or prn.

## 2020-09-26 LAB
HPV COMMENT: NORMAL
HPV TYPE 16: NOT DETECTED
HPV TYPE 18: NOT DETECTED
HPVOH (OTHER TYPES): NOT DETECTED

## 2020-11-25 ENCOUNTER — OFFICE VISIT (OUTPATIENT)
Dept: FAMILY MEDICINE CLINIC | Age: 65
End: 2020-11-25
Payer: COMMERCIAL

## 2020-11-25 VITALS
RESPIRATION RATE: 12 BRPM | WEIGHT: 161 LBS | OXYGEN SATURATION: 99 % | HEART RATE: 74 BPM | BODY MASS INDEX: 28.53 KG/M2 | TEMPERATURE: 97.6 F | SYSTOLIC BLOOD PRESSURE: 137 MMHG | HEIGHT: 63 IN | DIASTOLIC BLOOD PRESSURE: 75 MMHG

## 2020-11-25 PROCEDURE — 99215 OFFICE O/P EST HI 40 MIN: CPT | Performed by: FAMILY MEDICINE

## 2020-11-25 PROCEDURE — 90471 IMMUNIZATION ADMIN: CPT | Performed by: FAMILY MEDICINE

## 2020-11-25 PROCEDURE — 93000 ELECTROCARDIOGRAM COMPLETE: CPT | Performed by: FAMILY MEDICINE

## 2020-11-25 PROCEDURE — 90715 TDAP VACCINE 7 YRS/> IM: CPT | Performed by: FAMILY MEDICINE

## 2020-11-25 NOTE — PROGRESS NOTES
Preoperative Consultation      Radha Thompson  YOB: 1955    Date of Service:  11/25/2020    There were no vitals filed for this visit. Wt Readings from Last 2 Encounters:   11/25/20 161 lb (73 kg)   09/23/20 159 lb (72.1 kg)     BP Readings from Last 3 Encounters:   11/25/20 137/75   09/23/20 125/71   01/09/20 122/72        Chief Complaint   Patient presents with    Pre-op Exam     R rotator cuff Dr. Tami Lombardi w/ [facility] 12/2/20. Allergies   Allergen Reactions    Amoxicillin      Yeast infections    Diclofenac Swelling    Shellfish-Derived Products Hives     Outpatient Medications Marked as Taking for the 11/25/20 encounter (Office Visit) with Moises López MD   Medication Sig Dispense Refill    diazePAM (VALIUM) 5 MG tablet Take 0.5 tablets by mouth nightly as needed (TMJ) for up to 90 days. 30 tablet 0    estrogens, conjugated,-methylTESTOSTERone (ESTRATEST HS) 0.625-1.25 MG per tablet Take 1 tablet by mouth daily. 84 tablet 3    acyclovir (ZOVIRAX) 400 MG tablet TAKE 1 TABLET BY MOUTH EVERY 4 HOURS WHILE AWAKE x 5 d (Patient taking differently: TAKE 1 TABLET BY MOUTH EVERY 4 HOURS WHILE AWAKE PRN) 30 tablet 5    fexofenadine-pseudoephedrine (ALLEGRA-D ALLERGY & CONGESTION) 180-240 MG per extended release tablet Take 1 tablet by mouth daily 30 tablet 2    EPINEPHrine (EPIPEN 2-QUANG) 0.3 MG/0.3ML SOAJ injection Use as directed for allergic reaction 1 each 1    Multiple Vitamins-Minerals (THERAPEUTIC MULTIVITAMIN-MINERALS) tablet Take 1 tablet by mouth daily         This patient presents to the office today for a preoperative consultation at the request of surgeon, Dr. Francesco Gutierrez, who plans on performing right rotator cuff repair on December 2 at [facility]. The current problem began 5 years ago, and symptoms have been worsening with time. Conservative therapy: Yes: injection, Pt, which has been ineffective. .    Planned anesthesia: General Known anesthesia problems: None   Bleeding risk: No recent or remote history of abnormal bleeding  Personal or FH of DVT/PE: No    Patient objection to receiving blood products: No    Patient Active Problem List   Diagnosis    Vitamin B12 deficiency    Allergic rhinitis    Herpes simplex    Rosacea    Bilateral hearing loss       Past Medical History:   Diagnosis Date    Allergic rhinitis     Herpes simplex     Lower back pain     Screening mammogram     Vitamin B12 deficiency      Past Surgical History:   Procedure Laterality Date    CARPAL TUNNEL RELEASE      Neuroplasty Decompression Median Nerve      SECTION      CHOLECYSTECTOMY      MOHS SURGERY  2010    basal cell nose     Family History   Problem Relation Age of Onset    Heart Attack Mother 71    Other Mother         B 15 deficiency    Stomach Cancer Father 58    Heart Attack Maternal Grandmother 71    Other Brother         Irreg HR, ablation, sleep apnea    Other Sister         Irreg HR    High Cholesterol Sister      Social History     Socioeconomic History    Marital status:      Spouse name: Not on file    Number of children: Not on file    Years of education: Not on file    Highest education level: Not on file   Occupational History    Not on file   Social Needs    Financial resource strain: Not on file    Food insecurity     Worry: Not on file     Inability: Not on file    Transportation needs     Medical: Not on file     Non-medical: Not on file   Tobacco Use    Smoking status: Never Smoker    Smokeless tobacco: Never Used   Substance and Sexual Activity    Alcohol use:  Yes     Alcohol/week: 1.7 standard drinks     Types: 2 Standard drinks or equivalent per week     Comment: 1 - 2 drinks/week    Drug use: No    Sexual activity: Yes     Partners: Male   Lifestyle    Physical activity     Days per week: Not on file     Minutes per session: Not on file    Stress: Not on file   Relationships    Social Interpretation:  normal EKG, normal sinus rhythm. Lab Review   Lab Results   Component Value Date     09/23/2020    K 4.5 09/23/2020    CL 98 09/23/2020    CO2 24 09/23/2020    BUN 15 09/23/2020    CREATININE 0.7 09/23/2020    GLUCOSE 89 09/23/2020    CALCIUM 9.2 09/23/2020     Lab Results   Component Value Date    WBC 6.5 09/23/2020    HGB 13.3 09/23/2020    HCT 39.5 09/23/2020    MCV 97.2 09/23/2020     09/23/2020           Assessment:     None  63 y.o. patient with planned surgery as above. Known risk factors for perioperative complications:   Current medications which may produce withdrawal symptoms if withheld perioperatively: none      Plan:     1. Preoperative workup as follows: ECG  2. Change in medication regimen before surgery: Hold all medications on morning of surgery  3. Prophylaxis for cardiac events with perioperative beta-blockers: Not indicated  ACC/AHA indications for pre-operative beta-blocker use:    · Vascular surgery with history of postitive stress test  · Intermediate or high risk surgery with history of CAD   · Intermediate or high risk surgery with multiple clinical predictors of CAD- 2 of the following: history of compensated or prior heart failure, history of cerebrovascular disease, DM, or renal insufficiency    Routine administration of higher-dose, long-acting metoprolol in beta-blocker-naïve patients on the day of surgery, and in the absence of dose titration is associated with an overall increase in mortality. Beta-blockers should be started days to weeks prior to surgery and titrated to pulse < 70.  4. Deep vein thrombosis prophylaxis: not indicated  5.  No contraindications to planned surgery

## 2021-01-15 ENCOUNTER — PATIENT MESSAGE (OUTPATIENT)
Dept: FAMILY MEDICINE CLINIC | Age: 66
End: 2021-01-15

## 2021-01-15 DIAGNOSIS — N95.0 POST-MENOPAUSAL BLEEDING: ICD-10-CM

## 2021-01-15 RX ORDER — MEDROXYPROGESTERONE ACETATE 10 MG/1
10 TABLET ORAL DAILY
Qty: 7 TABLET | Refills: 0 | Status: SHIPPED | OUTPATIENT
Start: 2021-01-15 | End: 2021-01-18

## 2021-01-15 RX ORDER — FEXOFENADINE HCL AND PSEUDOEPHEDRINE HCI 180; 240 MG/1; MG/1
1 TABLET, EXTENDED RELEASE ORAL DAILY
Qty: 30 TABLET | Refills: 2 | Status: SHIPPED | OUTPATIENT
Start: 2021-01-15 | End: 2021-05-27 | Stop reason: SDUPTHER

## 2021-01-15 NOTE — TELEPHONE ENCOUNTER
From: Christiano Thompson  To: Crystal Kirkland MD  Sent: 1/15/2021 12:30 PM EST  Subject: Prescription Question    I need refills on two prescriptions that arent on my list of medications. I have been taking them for several years, but since they arent on the list it wont let me request refills. Allegra d 24 hour and progesterone 2.5 mg.    I have gotten the modern a covid vaccination, if you want to arielle my profile. I thought you called in refills on my last visit but [facility] says they dont have them. Thank you so much!   Heidi Kyle

## 2021-01-18 RX ORDER — MEDROXYPROGESTERONE ACETATE 2.5 MG/1
2.5 TABLET ORAL DAILY
Qty: 90 TABLET | Refills: 1 | Status: SHIPPED | OUTPATIENT
Start: 2021-01-18 | End: 2021-09-24 | Stop reason: SDUPTHER

## 2021-05-26 ENCOUNTER — PATIENT MESSAGE (OUTPATIENT)
Dept: FAMILY MEDICINE CLINIC | Age: 66
End: 2021-05-26

## 2021-05-27 RX ORDER — FEXOFENADINE HCL AND PSEUDOEPHEDRINE HCI 180; 240 MG/1; MG/1
1 TABLET, EXTENDED RELEASE ORAL DAILY
Qty: 30 TABLET | Refills: 2 | Status: SHIPPED | OUTPATIENT
Start: 2021-05-27 | End: 2021-06-02

## 2021-05-27 NOTE — TELEPHONE ENCOUNTER
From: Sudha Thompson  To: Isaias Siegel MD  Sent: 5/26/2021 6:58 PM EDT  Subject: Prescription Question    Hi Dr. Christiana Cannon, I have been out of [place] D 24 hour for a month. My pharmacy said they have sent multiple requests for a refill, Im not sure they have done that. I would like to request refills until the year is up, on my Allegra D 24 hour please.   Thank you, Jeaneth Palumbo

## 2021-05-27 NOTE — TELEPHONE ENCOUNTER
Requested Prescriptions     Pending Prescriptions Disp Refills    fexofenadine-pseudoephedrine (ALLEGRA-D ALLERGY & CONGESTION) 180-240 MG per extended release tablet 30 tablet 2     Sig: Take 1 tablet by mouth daily       LOV 11/25/2020  No f/u  Labs 9/23/20

## 2021-07-07 ENCOUNTER — PATIENT MESSAGE (OUTPATIENT)
Dept: FAMILY MEDICINE CLINIC | Age: 66
End: 2021-07-07

## 2021-07-07 DIAGNOSIS — M25.569 ACUTE KNEE PAIN, UNSPECIFIED LATERALITY: Primary | ICD-10-CM

## 2021-07-07 NOTE — TELEPHONE ENCOUNTER
From: Lala Thompson  To: Cecily Argueta MD  Sent: 7/7/2021 9:50 AM EDT  Subject: Non-Urgent Medical Question    Good morning, I have somehow hurt my knee, it is swollen and difficult to walk . My question is should I come and see you or try to get in with a specialist? I am going to travel out of the country in one month and need to be able to walk.  Thank you, Nataly Ross

## 2021-08-04 DIAGNOSIS — N95.1 MENOPAUSAL SYMPTOM: ICD-10-CM

## 2021-08-04 DIAGNOSIS — Z12.31 ENCOUNTER FOR SCREENING MAMMOGRAM FOR MALIGNANT NEOPLASM OF BREAST: Primary | ICD-10-CM

## 2021-08-04 NOTE — TELEPHONE ENCOUNTER
hormones refilled. Due mammogram; is important to do annually when on hormones. Please let Sander know.   Thanks

## 2021-08-04 NOTE — TELEPHONE ENCOUNTER
Requested Prescriptions     Pending Prescriptions Disp Refills    EST ESTROGENS-METHYLTEST HS 0.625-1.25 MG per tablet [Pharmacy Med Name: ESTROGEN-METHYLTESTOS H.S. TAB] 84 tablet 2     Sig: TAKE ONE TABLET BY MOUTH DAILY       LOV 11/25/2020  No f/u  Labs 9/23/20

## 2021-09-23 NOTE — PROGRESS NOTES
Preoperative Consultation      Margo Thompson  YOB: 1955    Date of Service:  9/24/2021    There were no vitals filed for this visit. Wt Readings from Last 2 Encounters:   09/24/21 167 lb (75.8 kg)   11/25/20 161 lb (73 kg)     BP Readings from Last 3 Encounters:   09/24/21 128/86   11/25/20 137/75   09/23/20 125/71        Chief Complaint   Patient presents with    Pre-op Exam     10/8, [place] knee meniscus repair     Allergies   Allergen Reactions    Amoxicillin      Yeast infections    Diclofenac Swelling    Shellfish-Derived Products Hives     Outpatient Medications Marked as Taking for the 9/24/21 encounter (Office Visit) with Vineet Henao MD   Medication Sig Dispense Refill    EST ESTROGENS-METHYLTEST HS 0.625-1.25 MG per tablet TAKE ONE TABLET BY MOUTH DAILY 84 tablet 0    fexofenadine-pseudoephedrine (ALLEGRA-D 24HR) 180-240 MG per extended release tablet Take one tablet daily  30 tablet 5    medroxyPROGESTERone (PROVERA) 2.5 MG tablet Take 1 tablet by mouth daily 90 tablet 1    acyclovir (ZOVIRAX) 400 MG tablet TAKE 1 TABLET BY MOUTH EVERY 4 HOURS WHILE AWAKE x 5 d (Patient taking differently: TAKE 1 TABLET BY MOUTH EVERY 4 HOURS WHILE AWAKE PRN) 30 tablet 5    EPINEPHrine (EPIPEN 2-QUANG) 0.3 MG/0.3ML SOAJ injection Use as directed for allergic reaction 1 each 1    Multiple Vitamins-Minerals (THERAPEUTIC MULTIVITAMIN-MINERALS) tablet Take 1 tablet by mouth daily         This patient presents to the office today for a preoperative consultation at the request of surgeon, Dr. Birgit Caldera, who plans on performing right knee arthroscopy on October 8 at [facility]. Symptoms started in July; failed NSAID, crutches.      Planned anesthesia: General   Known anesthesia problems: None   Bleeding risk: No recent or remote history of abnormal bleeding  Personal or FH of DVT/PE: No    Patient objection to receiving blood products: No    Patient Active Connections:     Frequency of Communication with Friends and Family:     Frequency of Social Gatherings with Friends and Family:     Attends Temple Services:     Active Member of Clubs or Organizations:     Attends Club or Organization Meetings:     Marital Status:    Intimate Partner Violence:     Fear of Current or Ex-Partner:     Emotionally Abused:     Physically Abused:     Sexually Abused:        Review of Systems  A comprehensive review of systems was negative except for what was noted in the HPI. Physical Exam   Constitutional: She is oriented to person, place, and time. She appears well-developed and well-nourished. No distress. HENT:   Head: Normocephalic and atraumatic. Mouth/Throat: Uvula is midline, oropharynx is clear and moist and mucous membranes are normal.   Eyes: Conjunctivae and EOM are normal. Pupils are equal, round, and reactive to light. Neck: Trachea normal and normal range of motion. Neck supple. No JVD present. Carotid bruit is not present. No mass and no thyromegaly present. Cardiovascular: Normal rate, regular rhythm, normal heart sounds and intact distal pulses. Exam reveals no gallop and no friction rub. No murmur heard. Pulmonary/Chest: Effort normal and breath sounds normal. No respiratory distress. She has no wheezes. She has no rales. Abdominal: Soft. Normal aorta and bowel sounds are normal. She exhibits no distension and no mass. There is no hepatosplenomegaly. No tenderness. Musculoskeletal: She exhibits no edema and no tenderness. Neurological: She is alert and oriented to person, place, and time. She has normal strength. No cranial nerve deficit or sensory deficit. Coordination and gait normal.   Skin: Skin is warm and dry. No rash noted. No erythema. Psychiatric: She has a normal mood and affect. Her behavior is normal.     EKG Interpretation:  normal sinus rhythm, unchanged from previous tracings.     Lab Review   Lab Results   Component Value Date     09/23/2020    K 4.5 09/23/2020    CL 98 09/23/2020    CO2 24 09/23/2020    BUN 15 09/23/2020    CREATININE 0.7 09/23/2020    GLUCOSE 89 09/23/2020    CALCIUM 9.2 09/23/2020           Assessment:       61 y.o. patient with planned surgery as above. Known risk factors for perioperative complications: None  Current medications which may produce withdrawal symptoms if withheld perioperatively: none      Plan:     1. Preoperative workup as follows: ECG  2. Change in medication regimen before surgery: None  3. Prophylaxis for cardiac events with perioperative beta-blockers: Not indicated  ACC/AHA indications for pre-operative beta-blocker use:    · Vascular surgery with history of postitive stress test  · Intermediate or high risk surgery with history of CAD   · Intermediate or high risk surgery with multiple clinical predictors of CAD- 2 of the following: history of compensated or prior heart failure, history of cerebrovascular disease, DM, or renal insufficiency    Routine administration of higher-dose, long-acting metoprolol in beta-blocker-naïve patients on the day of surgery, and in the absence of dose titration is associated with an overall increase in mortality. Beta-blockers should be started days to weeks prior to surgery and titrated to pulse < 70.  4. Deep vein thrombosis prophylaxis: regimen to be chosen by surgical team  5.  No contraindications to planned surgery

## 2021-09-24 ENCOUNTER — TELEPHONE (OUTPATIENT)
Dept: FAMILY MEDICINE CLINIC | Age: 66
End: 2021-09-24

## 2021-09-24 ENCOUNTER — OFFICE VISIT (OUTPATIENT)
Dept: FAMILY MEDICINE CLINIC | Age: 66
End: 2021-09-24
Payer: COMMERCIAL

## 2021-09-24 VITALS
SYSTOLIC BLOOD PRESSURE: 128 MMHG | BODY MASS INDEX: 29.59 KG/M2 | RESPIRATION RATE: 12 BRPM | TEMPERATURE: 97.2 F | DIASTOLIC BLOOD PRESSURE: 86 MMHG | HEART RATE: 70 BPM | HEIGHT: 63 IN | WEIGHT: 167 LBS | OXYGEN SATURATION: 96 %

## 2021-09-24 DIAGNOSIS — B00.9 HERPES SIMPLEX: ICD-10-CM

## 2021-09-24 DIAGNOSIS — N95.0 POST-MENOPAUSAL BLEEDING: ICD-10-CM

## 2021-09-24 DIAGNOSIS — Z01.818 PREOP EXAMINATION: Primary | ICD-10-CM

## 2021-09-24 DIAGNOSIS — N95.1 MENOPAUSAL SYMPTOM: ICD-10-CM

## 2021-09-24 DIAGNOSIS — Z23 NEED FOR INFLUENZA VACCINATION: ICD-10-CM

## 2021-09-24 PROCEDURE — 90471 IMMUNIZATION ADMIN: CPT | Performed by: FAMILY MEDICINE

## 2021-09-24 PROCEDURE — 90674 CCIIV4 VAC NO PRSV 0.5 ML IM: CPT | Performed by: FAMILY MEDICINE

## 2021-09-24 PROCEDURE — 99215 OFFICE O/P EST HI 40 MIN: CPT | Performed by: FAMILY MEDICINE

## 2021-09-24 PROCEDURE — 93000 ELECTROCARDIOGRAM COMPLETE: CPT | Performed by: FAMILY MEDICINE

## 2021-09-24 RX ORDER — MEDROXYPROGESTERONE ACETATE 2.5 MG/1
2.5 TABLET ORAL DAILY
Qty: 90 TABLET | Refills: 3 | Status: SHIPPED | OUTPATIENT
Start: 2021-09-24 | End: 2022-10-06 | Stop reason: SDUPTHER

## 2021-09-24 RX ORDER — EPINEPHRINE 0.3 MG/.3ML
INJECTION SUBCUTANEOUS
Qty: 1 EACH | Refills: 1 | Status: SHIPPED | OUTPATIENT
Start: 2021-09-24

## 2021-09-24 RX ORDER — ACYCLOVIR 400 MG/1
TABLET ORAL
Qty: 30 TABLET | Refills: 5 | Status: SHIPPED | OUTPATIENT
Start: 2021-09-24 | End: 2022-10-31 | Stop reason: SDUPTHER

## 2021-09-24 RX ORDER — FEXOFENADINE HCL AND PSEUDOEPHEDRINE HCI 180; 240 MG/1; MG/1
TABLET, EXTENDED RELEASE ORAL
Qty: 30 TABLET | Refills: 5 | Status: SHIPPED | OUTPATIENT
Start: 2021-09-24 | End: 2022-03-28

## 2021-09-24 RX ORDER — ESTERIFIED ESTROGEN AND METHYLTESTOSTERONE .625; 1.25 MG/1; MG/1
TABLET ORAL
Qty: 84 TABLET | Refills: 3 | Status: SHIPPED | OUTPATIENT
Start: 2021-09-24 | End: 2022-07-06

## 2021-09-24 ASSESSMENT — PATIENT HEALTH QUESTIONNAIRE - PHQ9
SUM OF ALL RESPONSES TO PHQ QUESTIONS 1-9: 0
SUM OF ALL RESPONSES TO PHQ9 QUESTIONS 1 & 2: 0
2. FEELING DOWN, DEPRESSED OR HOPELESS: 0
SUM OF ALL RESPONSES TO PHQ QUESTIONS 1-9: 0
1. LITTLE INTEREST OR PLEASURE IN DOING THINGS: 0
SUM OF ALL RESPONSES TO PHQ QUESTIONS 1-9: 0

## 2022-03-28 RX ORDER — FEXOFENADINE HCL AND PSEUDOEPHEDRINE HCI 180; 240 MG/1; MG/1
TABLET, EXTENDED RELEASE ORAL
Qty: 30 TABLET | Refills: 5 | Status: SHIPPED | OUTPATIENT
Start: 2022-03-28 | End: 2022-10-06 | Stop reason: SDUPTHER

## 2022-03-28 NOTE — TELEPHONE ENCOUNTER
Requested Prescriptions     Pending Prescriptions Disp Refills    fexofenadine-pseudoephedrine (ALLEGRA-D ALLERGY & CONGESTION) 180-240 MG per extended release tablet [Pharmacy Med Name: ALLEGRA-D 24 HOUR TABLET] 30 tablet 5     Sig: TAKE ONE TABLET BY MOUTH DAILY     Last ov 9/24/21  Last lab 9/23/20  Next ov n/a

## 2022-04-22 RX ORDER — SCOLOPAMINE TRANSDERMAL SYSTEM 1 MG/1
1 PATCH, EXTENDED RELEASE TRANSDERMAL
Qty: 4 PATCH | Refills: 0 | Status: SHIPPED | OUTPATIENT
Start: 2022-04-22 | End: 2022-05-04

## 2022-07-06 DIAGNOSIS — N95.1 MENOPAUSAL SYMPTOM: ICD-10-CM

## 2022-07-06 NOTE — TELEPHONE ENCOUNTER
Requested Prescriptions     Pending Prescriptions Disp Refills    estrogens, conjugated,-methylTESTOSTERone (EST ESTROGENS-METHYLTEST HS) 0.625-1.25 MG per tablet [Pharmacy Med Name: ESTROGEN-METHYLTESTOS H.S. TAB] 84 tablet      Sig: TAKE ONE TABLET BY MOUTH DAILY     Last ov 9/24/21  Last lab 9/23/20  Next ov n/a

## 2022-10-06 DIAGNOSIS — N95.0 POST-MENOPAUSAL BLEEDING: ICD-10-CM

## 2022-10-06 RX ORDER — MEDROXYPROGESTERONE ACETATE 2.5 MG/1
2.5 TABLET ORAL DAILY
Qty: 30 TABLET | Refills: 0 | Status: SHIPPED | OUTPATIENT
Start: 2022-10-06 | End: 2022-10-31 | Stop reason: SDUPTHER

## 2022-10-06 RX ORDER — FEXOFENADINE HCL AND PSEUDOEPHEDRINE HCI 180; 240 MG/1; MG/1
TABLET, EXTENDED RELEASE ORAL
Qty: 30 TABLET | Refills: 0 | Status: SHIPPED | OUTPATIENT
Start: 2022-10-06 | End: 2022-10-31 | Stop reason: SDUPTHER

## 2022-10-06 NOTE — TELEPHONE ENCOUNTER
Requested Prescriptions     Pending Prescriptions Disp Refills    medroxyPROGESTERone (PROVERA) 2.5 MG tablet 90 tablet 3     Sig: Take 1 tablet by mouth daily    fexofenadine-pseudoephedrine (ALLEGRA-D ALLERGY & CONGESTION) 180-240 MG per extended release tablet 30 tablet 5     Sig: TAKE ONE TABLET BY MOUTH DAILY     Last OV; 9/24/2021  Last labs; 9/23/2020  No f/u    Sent message to pt on MyChart, reminding her she is overdue for office visit and labs.

## 2022-10-29 PROBLEM — Z83.719 FAMILY HISTORY OF POLYPS IN THE COLON: Status: ACTIVE | Noted: 2022-10-29

## 2022-10-29 PROBLEM — Z83.71 FAMILY HISTORY OF POLYPS IN THE COLON: Status: ACTIVE | Noted: 2022-10-29

## 2022-10-29 PROBLEM — Z80.0 FAMILY HISTORY OF COLON CANCER: Status: ACTIVE | Noted: 2022-10-29

## 2022-10-29 NOTE — PROGRESS NOTES
Subjective:      Patient ID: Jaci Reed is a 74 y.o. female is here for her annual wellness exam.     HPI    PAP + HPV negative 9/202. No hx abn PAP in the past 10 years; one abnl before had her kids. Colonoscopy normal 2017.  5 year follow up recommended for FH colon cancer and polyps. Mammogram normal 10/19/22. FH neg for breast cancer. On HFT; needs annual screening. Vaccines: due pneumococcal, flu, COVID booster  Dexa scan: normal 2016. Diet: eating well  Exercise: very active in house and yard.        Health Maintenance   Topic Date Due    COVID-19 Vaccine (3 - Booster for Moderna series) 03/23/2021    Pneumococcal 65+ years Vaccine (1 - PCV) Never done    Colorectal Cancer Screen  03/01/2022    Flu vaccine (1) 08/01/2022    Depression Screen  09/24/2022    Breast cancer screen  10/19/2023    Lipids  09/23/2025    Cervical cancer screen  09/23/2025    DTaP/Tdap/Td vaccine (3 - Td or Tdap) 11/25/2030    DEXA (modify frequency per FRAX score)  Completed    Shingles vaccine  Completed    Hepatitis C screen  Completed    HIV screen  Completed    Hepatitis A vaccine  Aged Out    Hib vaccine  Aged Out    Meningococcal (ACWY) vaccine  Aged Out           Outpatient Medications Marked as Taking for the 10/31/22 encounter (Office Visit) with Demi Do MD   Medication Sig Dispense Refill    medroxyPROGESTERone (PROVERA) 2.5 MG tablet Take 1 tablet by mouth daily 90 tablet 3    estrogens, conjugated,-methylTESTOSTERone (EST ESTROGENS-METHYLTEST HS) 0.625-1.25 MG per tablet TAKE ONE TABLET BY MOUTH DAILY 84 tablet 3    acyclovir (ZOVIRAX) 400 MG tablet TAKE 1 TABLET BY MOUTH EVERY 4 HOURS WHILE AWAKE x 5 d 30 tablet 5    fexofenadine-pseudoephedrine (ALLEGRA-D ALLERGY & CONGESTION) 180-240 MG per extended release tablet TAKE ONE TABLET BY MOUTH DAILY 90 tablet 3    EPINEPHrine (EPIPEN 2-QUANG) 0.3 MG/0.3ML SOAJ injection Use as directed for allergic reaction 1 each 1       Allergies   Allergen Reactions    Amoxicillin      Yeast infections    Diclofenac Swelling    Shellfish-Derived Products Hives       Patient Active Problem List   Diagnosis    Vitamin B12 deficiency    Allergic rhinitis    Herpes simplex    Rosacea    Bilateral hearing loss    Family history of polyps in the colon    Family history of colon cancer        Past Medical History:   Diagnosis Date    Allergic rhinitis     Herpes simplex     Lower back pain     Screening mammogram     Vitamin B12 deficiency        Past Surgical History:   Procedure Laterality Date    CARPAL TUNNEL RELEASE      Neuroplasty Decompression Median Nerve      SECTION      CHOLECYSTECTOMY      MOHS SURGERY  2010    basal cell nose       Social History     Tobacco Use    Smoking status: Never    Smokeless tobacco: Never   Substance Use Topics    Alcohol use: Yes     Alcohol/week: 1.7 standard drinks     Types: 2 Standard drinks or equivalent per week     Comment: 1 - 2 drinks/week    Drug use: No       Family History   Problem Relation Age of Onset    Heart Attack Mother 71    Other Mother         B 15 deficiency    Stomach Cancer Father 58    Heart Attack Maternal Grandmother 71    Other Brother         Irreg HR, ablation, sleep apnea    Other Sister         Irreg HR    High Cholesterol Sister        Review of Systems  Review of Systems   Constitutional:  Negative for activity change, appetite change, fatigue and unexpected weight change. HENT:  Positive for hearing loss and tinnitus. Negative for congestion, nosebleeds, sore throat, trouble swallowing and voice change. Wears hearing aides. Allegra is effective for allergies    Eyes:  Negative for visual disturbance. Respiratory:  Negative for choking, chest tightness, shortness of breath and wheezing. Cardiovascular:  Negative for chest pain, palpitations and leg swelling. Gastrointestinal:  Negative for abdominal pain, anal bleeding, blood in stool, constipation, diarrhea and nausea. Genitourinary:  Negative for dysuria, flank pain, frequency, hematuria, pelvic pain, vaginal bleeding and vaginal discharge. Musculoskeletal:  Negative for arthralgias, back pain and myalgias. Skin:  Negative for color change and rash. Sees derm annually. Neurological:  Negative for light-headedness and headaches. Hematological:  Does not bruise/bleed easily. Psychiatric/Behavioral:  Positive for sleep disturbance. Negative for dysphoric mood. The patient is not nervous/anxious. Some trouble with waking up in the am and sometimes has trouble getting back to sleep.     Lab Results   Component Value Date     (L) 09/23/2020    K 4.5 09/23/2020    CL 98 (L) 09/23/2020    CO2 24 09/23/2020    BUN 15 09/23/2020    CREATININE 0.7 09/23/2020    GLUCOSE 89 09/23/2020    CALCIUM 9.2 09/23/2020    PROT 6.4 09/23/2020    LABALBU 4.4 09/23/2020    BILITOT 0.5 09/23/2020    ALKPHOS 60 09/23/2020    AST 18 09/23/2020    ALT 21 09/23/2020    LABGLOM >60 09/23/2020    GFRAA >60 09/23/2020    AGRATIO 2.2 09/23/2020    GLOB 2.0 09/23/2020        Lab Results   Component Value Date    WBC 6.5 09/23/2020    HGB 13.3 09/23/2020    HCT 39.5 09/23/2020    MCV 97.2 09/23/2020     09/23/2020     TSH   Date Value Ref Range Status   09/23/2020 1.85 0.27 - 4.20 uIU/mL Final   06/12/2018 1.73 0.27 - 4.20 uIU/mL Final   02/17/2017 1.53 0.27 - 4.20 uIU/mL Final   02/01/2013 1.17 0.35 - 5.5 uIU/ml Final   06/07/2011 1.36 0.35 - 5.5 uIU/ml Final     Lab Results   Component Value Date    CHOL 193 09/23/2020    CHOL 176 06/12/2018    CHOL 177 02/17/2017     Lab Results   Component Value Date    TRIG 84 09/23/2020    TRIG 50 06/12/2018    TRIG 49 02/17/2017     Lab Results   Component Value Date    HDL 71 (H) 09/23/2020    HDL 67 (H) 06/12/2018    HDL 73 (H) 02/17/2017     Lab Results   Component Value Date    LDLCALC 105 (H) 09/23/2020    LDLCALC 99 06/12/2018    LDLCALC 94 02/17/2017     Lab Results   Component Value Date    LABVLDL 17 09/23/2020    LABVLDL 10 06/12/2018    LABVLDL 10 02/17/2017     No results found for: COLTON   Objective:   Physical Exam  .  Vitals:    10/31/22 1518   BP: 130/70   Pulse: 76   Resp: 14   Temp: 98.1 °F (36.7 °C)   SpO2: 97%     Wt Readings from Last 3 Encounters:   10/31/22 165 lb 3.2 oz (74.9 kg)   09/24/21 167 lb (75.8 kg)   11/25/20 161 lb (73 kg)        Physical Exam  Constitutional:       Appearance: Normal appearance. She is well-developed. HENT:      Head: Normocephalic and atraumatic. Right Ear: Hearing, tympanic membrane, ear canal and external ear normal.      Left Ear: Hearing, tympanic membrane, ear canal and external ear normal.      Nose: Nose normal.   Eyes:      General: Lids are normal.      Conjunctiva/sclera: Conjunctivae normal.   Neck:      Thyroid: No thyroid mass or thyromegaly. Trachea: Trachea normal.   Cardiovascular:      Rate and Rhythm: Normal rate and regular rhythm. Pulses: Normal pulses. Heart sounds: Normal heart sounds. No murmur heard. Pulmonary:      Effort: Pulmonary effort is normal.      Breath sounds: Normal breath sounds. Abdominal:      General: Bowel sounds are normal.      Palpations: Abdomen is soft. Tenderness: There is no abdominal tenderness. Hernia: No hernia is present. Musculoskeletal:      Cervical back: Neck supple. Lymphadenopathy:      Head:      Right side of head: No submandibular adenopathy. Left side of head: No submandibular adenopathy. Cervical: No cervical adenopathy. Skin:     General: Skin is warm and dry. Findings: No lesion or rash. Comments: No abnormal appearing lesions on exposed skin   Neurological:      Mental Status: She is alert and oriented to person, place, and time. Gait: Gait normal.      Deep Tendon Reflexes:      Reflex Scores:       Patellar reflexes are 2+ on the right side and 2+ on the left side.   Psychiatric:         Speech: Speech normal.         Behavior: Behavior normal.         Judgment: Judgment normal.         Assessment and Plan       Diagnosis Orders   1. Well adult exam  Comprehensive Metabolic Panel    TSH with Reflex    Lipid Panel    CBC    Discussed diet, exercise   Calcium and Vitamin D addressed  PAP due 2025  Annual mammogram  Annual influenza vaccine  Dt every 10 years  Colonoscopy every 10 years until age 76  Dexa scan every 3 to 5 years        2. Need for vaccination  Influenza, FLUAD, (age 72 y+), IM, Preservative Free, 0.5 mL    Pneumococcal, PCV20, PREVNAR 20, (age 25 yrs+), IM, PF      3. Family history of colon cancer  Amb External Referral To Gastroenterology      4. Family history of polyps in the colon  Amb External Referral To Gastroenterology                      5 Menopausal symptom  estrogens, conjugated,-methylTESTOSTERone (EST ESTROGENS-METHYLTEST HS) 0.625-1.25 MG per tablet  medroxyPROGESTERone (PROVERA) 2.5 MG tablet  Annual mammogram while on HRT      6 Herpes simplex  acyclovir (ZOVIRAX) 400 MG tablet          Side effects of current medications reviewed and questions answered. Follow up in 1 year or prn.

## 2022-10-31 ENCOUNTER — OFFICE VISIT (OUTPATIENT)
Dept: FAMILY MEDICINE CLINIC | Age: 67
End: 2022-10-31
Payer: COMMERCIAL

## 2022-10-31 VITALS
RESPIRATION RATE: 14 BRPM | DIASTOLIC BLOOD PRESSURE: 70 MMHG | SYSTOLIC BLOOD PRESSURE: 130 MMHG | OXYGEN SATURATION: 97 % | HEART RATE: 76 BPM | HEIGHT: 63 IN | TEMPERATURE: 98.1 F | WEIGHT: 165.2 LBS | BODY MASS INDEX: 29.27 KG/M2

## 2022-10-31 DIAGNOSIS — Z80.0 FAMILY HISTORY OF COLON CANCER: ICD-10-CM

## 2022-10-31 DIAGNOSIS — N95.1 MENOPAUSAL SYMPTOM: ICD-10-CM

## 2022-10-31 DIAGNOSIS — Z83.71 FAMILY HISTORY OF POLYPS IN THE COLON: ICD-10-CM

## 2022-10-31 DIAGNOSIS — B00.9 HERPES SIMPLEX: ICD-10-CM

## 2022-10-31 DIAGNOSIS — Z23 NEED FOR VACCINATION: ICD-10-CM

## 2022-10-31 DIAGNOSIS — Z00.00 WELL ADULT EXAM: Primary | ICD-10-CM

## 2022-10-31 PROCEDURE — 90694 VACC AIIV4 NO PRSRV 0.5ML IM: CPT | Performed by: FAMILY MEDICINE

## 2022-10-31 PROCEDURE — 90471 IMMUNIZATION ADMIN: CPT | Performed by: FAMILY MEDICINE

## 2022-10-31 PROCEDURE — 90472 IMMUNIZATION ADMIN EACH ADD: CPT | Performed by: FAMILY MEDICINE

## 2022-10-31 PROCEDURE — 90677 PCV20 VACCINE IM: CPT | Performed by: FAMILY MEDICINE

## 2022-10-31 PROCEDURE — 99397 PER PM REEVAL EST PAT 65+ YR: CPT | Performed by: FAMILY MEDICINE

## 2022-10-31 RX ORDER — MEDROXYPROGESTERONE ACETATE 2.5 MG/1
2.5 TABLET ORAL DAILY
Qty: 90 TABLET | Refills: 3 | Status: SHIPPED | OUTPATIENT
Start: 2022-10-31

## 2022-10-31 RX ORDER — FEXOFENADINE HCL AND PSEUDOEPHEDRINE HCI 180; 240 MG/1; MG/1
TABLET, EXTENDED RELEASE ORAL
Qty: 90 TABLET | Refills: 3 | Status: SHIPPED | OUTPATIENT
Start: 2022-10-31

## 2022-10-31 RX ORDER — ACYCLOVIR 400 MG/1
TABLET ORAL
Qty: 30 TABLET | Refills: 5 | Status: SHIPPED | OUTPATIENT
Start: 2022-10-31

## 2022-10-31 SDOH — ECONOMIC STABILITY: FOOD INSECURITY: WITHIN THE PAST 12 MONTHS, THE FOOD YOU BOUGHT JUST DIDN'T LAST AND YOU DIDN'T HAVE MONEY TO GET MORE.: NEVER TRUE

## 2022-10-31 SDOH — ECONOMIC STABILITY: FOOD INSECURITY: WITHIN THE PAST 12 MONTHS, YOU WORRIED THAT YOUR FOOD WOULD RUN OUT BEFORE YOU GOT MONEY TO BUY MORE.: NEVER TRUE

## 2022-10-31 ASSESSMENT — PATIENT HEALTH QUESTIONNAIRE - PHQ9
SUM OF ALL RESPONSES TO PHQ QUESTIONS 1-9: 0
2. FEELING DOWN, DEPRESSED OR HOPELESS: 0
SUM OF ALL RESPONSES TO PHQ QUESTIONS 1-9: 0
1. LITTLE INTEREST OR PLEASURE IN DOING THINGS: 0
SUM OF ALL RESPONSES TO PHQ9 QUESTIONS 1 & 2: 0

## 2022-10-31 ASSESSMENT — ENCOUNTER SYMPTOMS
BACK PAIN: 0
WHEEZING: 0
NAUSEA: 0
CHEST TIGHTNESS: 0
ANAL BLEEDING: 0
COLOR CHANGE: 0
BLOOD IN STOOL: 0
TROUBLE SWALLOWING: 0
ABDOMINAL PAIN: 0
DIARRHEA: 0
CONSTIPATION: 0
VOICE CHANGE: 0
SORE THROAT: 0
SHORTNESS OF BREATH: 0
CHOKING: 0

## 2022-10-31 ASSESSMENT — SOCIAL DETERMINANTS OF HEALTH (SDOH): HOW HARD IS IT FOR YOU TO PAY FOR THE VERY BASICS LIKE FOOD, HOUSING, MEDICAL CARE, AND HEATING?: NOT HARD AT ALL

## 2022-11-01 DIAGNOSIS — Z00.00 WELL ADULT EXAM: ICD-10-CM

## 2022-11-01 LAB
A/G RATIO: 2 (ref 1.1–2.2)
ALBUMIN SERPL-MCNC: 4.5 G/DL (ref 3.4–5)
ALP BLD-CCNC: 65 U/L (ref 40–129)
ALT SERPL-CCNC: 22 U/L (ref 10–40)
ANION GAP SERPL CALCULATED.3IONS-SCNC: 15 MMOL/L (ref 3–16)
AST SERPL-CCNC: 17 U/L (ref 15–37)
BILIRUB SERPL-MCNC: 0.4 MG/DL (ref 0–1)
BUN BLDV-MCNC: 13 MG/DL (ref 7–20)
CALCIUM SERPL-MCNC: 9.5 MG/DL (ref 8.3–10.6)
CHLORIDE BLD-SCNC: 105 MMOL/L (ref 99–110)
CHOLESTEROL, TOTAL: 171 MG/DL (ref 0–199)
CO2: 22 MMOL/L (ref 21–32)
CREAT SERPL-MCNC: 0.7 MG/DL (ref 0.6–1.2)
GFR SERPL CREATININE-BSD FRML MDRD: >60 ML/MIN/{1.73_M2}
GLUCOSE BLD-MCNC: 82 MG/DL (ref 70–99)
HCT VFR BLD CALC: 41.4 % (ref 36–48)
HDLC SERPL-MCNC: 59 MG/DL (ref 40–60)
HEMOGLOBIN: 13.8 G/DL (ref 12–16)
LDL CHOLESTEROL CALCULATED: 99 MG/DL
MCH RBC QN AUTO: 32.5 PG (ref 26–34)
MCHC RBC AUTO-ENTMCNC: 33.3 G/DL (ref 31–36)
MCV RBC AUTO: 97.6 FL (ref 80–100)
PDW BLD-RTO: 13.1 % (ref 12.4–15.4)
PLATELET # BLD: 261 K/UL (ref 135–450)
PMV BLD AUTO: 8.2 FL (ref 5–10.5)
POTASSIUM SERPL-SCNC: 4.7 MMOL/L (ref 3.5–5.1)
RBC # BLD: 4.25 M/UL (ref 4–5.2)
SODIUM BLD-SCNC: 142 MMOL/L (ref 136–145)
TOTAL PROTEIN: 6.7 G/DL (ref 6.4–8.2)
TRIGL SERPL-MCNC: 67 MG/DL (ref 0–150)
TSH REFLEX: 1.97 UIU/ML (ref 0.27–4.2)
VLDLC SERPL CALC-MCNC: 13 MG/DL
WBC # BLD: 9.1 K/UL (ref 4–11)

## 2023-03-29 ENCOUNTER — TELEPHONE (OUTPATIENT)
Dept: FAMILY MEDICINE CLINIC | Age: 68
End: 2023-03-29

## 2023-05-02 DIAGNOSIS — R10.13 EPIGASTRIC PAIN: ICD-10-CM

## 2023-05-03 LAB
ALBUMIN SERPL-MCNC: 4.7 G/DL (ref 3.4–5)
ALBUMIN/GLOB SERPL: 2.2 {RATIO} (ref 1.1–2.2)
ALP SERPL-CCNC: 65 U/L (ref 40–129)
ALT SERPL-CCNC: 19 U/L (ref 10–40)
ANION GAP SERPL CALCULATED.3IONS-SCNC: 11 MMOL/L (ref 3–16)
AST SERPL-CCNC: 17 U/L (ref 15–37)
BASOPHILS # BLD: 0.1 K/UL (ref 0–0.2)
BASOPHILS NFR BLD: 0.9 %
BILIRUB SERPL-MCNC: 0.3 MG/DL (ref 0–1)
BUN SERPL-MCNC: 17 MG/DL (ref 7–20)
CALCIUM SERPL-MCNC: 9.7 MG/DL (ref 8.3–10.6)
CHLORIDE SERPL-SCNC: 102 MMOL/L (ref 99–110)
CO2 SERPL-SCNC: 25 MMOL/L (ref 21–32)
CREAT SERPL-MCNC: 0.7 MG/DL (ref 0.6–1.2)
DEPRECATED RDW RBC AUTO: 13.4 % (ref 12.4–15.4)
EOSINOPHIL # BLD: 0.1 K/UL (ref 0–0.6)
EOSINOPHIL NFR BLD: 1 %
GFR SERPLBLD CREATININE-BSD FMLA CKD-EPI: >60 ML/MIN/{1.73_M2}
GLUCOSE SERPL-MCNC: 91 MG/DL (ref 70–99)
HCT VFR BLD AUTO: 40.6 % (ref 36–48)
HGB BLD-MCNC: 13.6 G/DL (ref 12–16)
LIPASE SERPL-CCNC: 32 U/L (ref 13–60)
LYMPHOCYTES # BLD: 2.9 K/UL (ref 1–5.1)
LYMPHOCYTES NFR BLD: 33.6 %
MCH RBC QN AUTO: 32.3 PG (ref 26–34)
MCHC RBC AUTO-ENTMCNC: 33.4 G/DL (ref 31–36)
MCV RBC AUTO: 96.6 FL (ref 80–100)
MONOCYTES # BLD: 0.8 K/UL (ref 0–1.3)
MONOCYTES NFR BLD: 9.1 %
NEUTROPHILS # BLD: 4.7 K/UL (ref 1.7–7.7)
NEUTROPHILS NFR BLD: 55.4 %
PLATELET # BLD AUTO: 293 K/UL (ref 135–450)
PMV BLD AUTO: 8.3 FL (ref 5–10.5)
POTASSIUM SERPL-SCNC: 4.4 MMOL/L (ref 3.5–5.1)
PROT SERPL-MCNC: 6.8 G/DL (ref 6.4–8.2)
RBC # BLD AUTO: 4.2 M/UL (ref 4–5.2)
SODIUM SERPL-SCNC: 138 MMOL/L (ref 136–145)
WBC # BLD AUTO: 8.6 K/UL (ref 4–11)

## 2023-06-17 DIAGNOSIS — N95.1 MENOPAUSAL SYMPTOM: ICD-10-CM

## 2023-06-19 NOTE — TELEPHONE ENCOUNTER
Requested Prescriptions     Pending Prescriptions Disp Refills    estrogens, conjugated,-methylTESTOSTERone (EST ESTROGENS-METHYLTEST HS) 0.625-1.25 MG per tablet [Pharmacy Med Name: Est Estrogens-Methyltest HS Oral Tablet 0.625-1.25 MG] 84 tablet 0     Sig: TAKE 1 TABLET BY MOUTH EVERY DAY         Last OV; 5/2/2023  Last labs; 5/2/2023  F/u no

## 2023-07-24 ENCOUNTER — PATIENT MESSAGE (OUTPATIENT)
Age: 68
End: 2023-07-24

## 2023-07-24 DIAGNOSIS — T63.451A HORNET STING, ACCIDENTAL OR UNINTENTIONAL, INITIAL ENCOUNTER: Primary | ICD-10-CM

## 2023-07-24 RX ORDER — TRIAMCINOLONE ACETONIDE 1 MG/G
CREAM TOPICAL 3 TIMES DAILY PRN
Qty: 45 G | Refills: 2 | Status: SHIPPED | OUTPATIENT
Start: 2023-07-24

## 2023-08-30 ENCOUNTER — PATIENT MESSAGE (OUTPATIENT)
Age: 68
End: 2023-08-30

## 2023-08-30 RX ORDER — SCOLOPAMINE TRANSDERMAL SYSTEM 1 MG/1
1 PATCH, EXTENDED RELEASE TRANSDERMAL
Qty: 8 PATCH | Refills: 0 | Status: SHIPPED | OUTPATIENT
Start: 2023-08-30

## 2023-08-30 NOTE — TELEPHONE ENCOUNTER
From: Bety Thompson  To: Dr. Diya Dan: 8/30/2023 7:13 AM EDT  Subject: Scopolamine transdermal patches    Good morning Dr Cherelle Carlin, I am going on a cruise in two weeks and I was wondering if you would call in an RX for the patches to [facility] in [place] for me?   Thank you, Estrella Billingsley

## 2023-10-23 NOTE — PROGRESS NOTES
Subjective:      Patient ID: Kris Thompson 79 y.o. female. The encounter diagnosis was Elevated blood pressure reading. HPI      FH positive for HTN in bro, parents. Has checked BP for the past 2 to 3 weeks. Has occ HA and has noted heart is beating hard when she first lays down. No trouble falling to sleep and does not reoccur if wakes up. HR is not irregular. Daily HA for a few weeks. Back of the head. Tight. Eye sight feels off later in the day; saw eye doctor and was told has early cataracts. No nausea, vomiting. Has not had a posterior HA before. Had some neck stiffness after a trip; not clearly related to the HA. HA comes on in the afternoon. Mild. Occ takes Tylenol helps. The patient denies any symptoms of neurological impairment or TIA's; no amaurosis, diplopia, dysphasia, or unilateral disturbance of motor or sensory function. No loss of balance or vertigo. Patient denies any exertional chest pain, dyspnea, palpitations, syncope, orthopnea, edema or paroxysmal nocturnal dyspnea. Sleeps well. Some stress; not sure if related to HA. Is eating well but does not always drink enough flood. Eats a low Na diet and it not taking NSAIDS. Exercise: walks, house work. 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  is an alcoholic; he lost his job. Is worsening recently. Can be verbally but not physically abusive.      Has mammo scheduled on Friday      BP Readings from Last 3 Encounters:   05/02/23 136/84   10/31/22 130/70   09/24/21 128/86      Lab Results   Component Value Date/Time     05/02/2023 03:06 PM    K 4.4 05/02/2023 03:06 PM     05/02/2023 03:06 PM    CO2 25 05/02/2023 03:06 PM    BUN 17 05/02/2023 03:06 PM    CREATININE 0.7 05/02/2023 03:06 PM    GLUCOSE 91 05/02/2023 03:06 PM    CALCIUM 9.7 05/02/2023 03:06 PM    LABGLOM >60 05/02/2023 03:06 PM         TSH   Date Value Ref Range Status   11/01/2022

## 2023-10-24 ENCOUNTER — OFFICE VISIT (OUTPATIENT)
Age: 68
End: 2023-10-24
Payer: COMMERCIAL

## 2023-10-24 VITALS
DIASTOLIC BLOOD PRESSURE: 80 MMHG | OXYGEN SATURATION: 99 % | SYSTOLIC BLOOD PRESSURE: 146 MMHG | RESPIRATION RATE: 16 BRPM | TEMPERATURE: 97.3 F | HEART RATE: 67 BPM | BODY MASS INDEX: 30.43 KG/M2 | WEIGHT: 171.8 LBS

## 2023-10-24 DIAGNOSIS — F43.9 STRESS: ICD-10-CM

## 2023-10-24 DIAGNOSIS — R03.0 ELEVATED BLOOD PRESSURE READING: Primary | ICD-10-CM

## 2023-10-24 LAB
BILIRUBIN, POC: NEGATIVE
BLOOD URINE, POC: NORMAL
CLARITY, POC: CLEAR
COLOR, POC: YELLOW
GLUCOSE URINE, POC: NEGATIVE
KETONES, POC: NEGATIVE
LEUKOCYTE EST, POC: NEGATIVE
NITRITE, POC: NEGATIVE
PH, POC: 7
PROTEIN, POC: NEGATIVE
SPECIFIC GRAVITY, POC: 1.02
UROBILINOGEN, POC: 0.2

## 2023-10-24 PROCEDURE — 1123F ACP DISCUSS/DSCN MKR DOCD: CPT | Performed by: FAMILY MEDICINE

## 2023-10-24 PROCEDURE — 99214 OFFICE O/P EST MOD 30 MIN: CPT | Performed by: FAMILY MEDICINE

## 2023-10-24 PROCEDURE — 81002 URINALYSIS NONAUTO W/O SCOPE: CPT | Performed by: FAMILY MEDICINE

## 2023-10-24 PROCEDURE — 93000 ELECTROCARDIOGRAM COMPLETE: CPT | Performed by: FAMILY MEDICINE

## 2023-10-24 ASSESSMENT — PATIENT HEALTH QUESTIONNAIRE - PHQ9
4. FEELING TIRED OR HAVING LITTLE ENERGY: 0
SUM OF ALL RESPONSES TO PHQ QUESTIONS 1-9: 5
10. IF YOU CHECKED OFF ANY PROBLEMS, HOW DIFFICULT HAVE THESE PROBLEMS MADE IT FOR YOU TO DO YOUR WORK, TAKE CARE OF THINGS AT HOME, OR GET ALONG WITH OTHER PEOPLE: 0
SUM OF ALL RESPONSES TO PHQ QUESTIONS 1-9: 5
6. FEELING BAD ABOUT YOURSELF - OR THAT YOU ARE A FAILURE OR HAVE LET YOURSELF OR YOUR FAMILY DOWN: 0
5. POOR APPETITE OR OVEREATING: 0
8. MOVING OR SPEAKING SO SLOWLY THAT OTHER PEOPLE COULD HAVE NOTICED. OR THE OPPOSITE, BEING SO FIGETY OR RESTLESS THAT YOU HAVE BEEN MOVING AROUND A LOT MORE THAN USUAL: 0
SUM OF ALL RESPONSES TO PHQ9 QUESTIONS 1 & 2: 3
SUM OF ALL RESPONSES TO PHQ QUESTIONS 1-9: 5
7. TROUBLE CONCENTRATING ON THINGS, SUCH AS READING THE NEWSPAPER OR WATCHING TELEVISION: 1
2. FEELING DOWN, DEPRESSED OR HOPELESS: 0
SUM OF ALL RESPONSES TO PHQ QUESTIONS 1-9: 5
1. LITTLE INTEREST OR PLEASURE IN DOING THINGS: 3
9. THOUGHTS THAT YOU WOULD BE BETTER OFF DEAD, OR OF HURTING YOURSELF: 0
3. TROUBLE FALLING OR STAYING ASLEEP: 1

## 2023-10-26 DIAGNOSIS — N95.1 MENOPAUSAL SYMPTOM: ICD-10-CM

## 2023-10-26 RX ORDER — MEDROXYPROGESTERONE ACETATE 2.5 MG/1
2.5 TABLET ORAL DAILY
Qty: 90 TABLET | Refills: 0 | Status: SHIPPED | OUTPATIENT
Start: 2023-10-26

## 2023-10-26 NOTE — TELEPHONE ENCOUNTER
Requested Prescriptions     Pending Prescriptions Disp Refills    medroxyPROGESTERone (PROVERA) 2.5 MG tablet [Pharmacy Med Name: medroxyPROGESTERone Acetate Oral Tablet 2.5 MG] 90 tablet 0     Sig: Take 1 tablet by mouth daily        Last OV: 10/24/23    Last labs: 5/2/23     F/u: None

## 2023-10-31 ENCOUNTER — OFFICE VISIT (OUTPATIENT)
Age: 68
End: 2023-10-31
Payer: COMMERCIAL

## 2023-10-31 DIAGNOSIS — F43.23 ADJUSTMENT DISORDER WITH MIXED ANXIETY AND DEPRESSED MOOD: Primary | ICD-10-CM

## 2023-10-31 PROCEDURE — 90791 PSYCH DIAGNOSTIC EVALUATION: CPT | Performed by: PSYCHOLOGIST

## 2023-10-31 PROCEDURE — 1123F ACP DISCUSS/DSCN MKR DOCD: CPT | Performed by: PSYCHOLOGIST

## 2023-10-31 ASSESSMENT — PROMIS GLOBAL HEALTH SCALE
IN THE PAST 7 DAYS, HOW WOULD YOU RATE YOUR PAIN ON AVERAGE [ON A SCALE FROM 0 (NO PAIN) TO 10 (WORST IMAGINABLE PAIN)]?: 4
IN GENERAL, WOULD YOU SAY YOUR QUALITY OF LIFE IS...[ON A SCALE OF 1 (POOR) TO 5 (EXCELLENT)]: 3
SUM OF RESPONSES TO QUESTIONS 2, 4, 5, & 10: 11
IN GENERAL, HOW WOULD YOU RATE YOUR MENTAL HEALTH, INCLUDING YOUR MOOD AND YOUR ABILITY TO THINK [ON A SCALE OF 1 (POOR) TO 5 (EXCELLENT)]?: 2
IN GENERAL, WOULD YOU SAY YOUR HEALTH IS...[ON A SCALE OF 1 (POOR) TO 5 (EXCELLENT)]: 3
SUM OF RESPONSES TO QUESTIONS 3, 6, 7, & 8: 16
IN GENERAL, HOW WOULD YOU RATE YOUR PHYSICAL HEALTH [ON A SCALE OF 1 (POOR) TO 5 (EXCELLENT)]?: 3
IN THE PAST 7 DAYS, HOW OFTEN HAVE YOU BEEN BOTHERED BY EMOTIONAL PROBLEMS, SUCH AS FEELING ANXIOUS, DEPRESSED, OR IRRITABLE [ON A SCALE FROM 1 (NEVER) TO 5 (ALWAYS)]?: 3
IN THE PAST 7 DAYS, HOW WOULD YOU RATE YOUR FATIGUE ON AVERAGE [ON A SCALE FROM 1 (NONE) TO 5 (VERY SEVERE)]?: 5
IN GENERAL, PLEASE RATE HOW WELL YOU CARRY OUT YOUR USUAL SOCIAL ACTIVITIES (INCLUDES ACTIVITIES AT HOME, AT WORK, AND IN YOUR COMMUNITY, AND RESPONSIBILITIES AS A PARENT, CHILD, SPOUSE, EMPLOYEE, FRIEND, ETC) [ON A SCALE OF 1 (POOR) TO 5 (EXCELLENT)]?: 3
TO WHAT EXTENT ARE YOU ABLE TO CARRY OUT YOUR EVERYDAY PHYSICAL ACTIVITIES SUCH AS WALKING, CLIMBING STAIRS, CARRYING GROCERIES, OR MOVING A CHAIR [ON A SCALE OF 1 (NOT AT ALL) TO 5 (COMPLETELY)]?: 4
IN GENERAL, HOW WOULD YOU RATE YOUR SATISFACTION WITH YOUR SOCIAL ACTIVITIES AND RELATIONSHIPS [ON A SCALE OF 1 (POOR) TO 5 (EXCELLENT)]?: 3

## 2023-10-31 NOTE — PROGRESS NOTES
Behavioral Health Consultation   Sherri Joseph, PhD  Clinical Psychologist  10/31/2023      Time spent with Patient: 27 minutes  8:03-8:30  This is patient's 1st  [facility] appointment. Reason for Consult:   Chief Complaint   Patient presents with    Depression    Stress    Anxiety       Referring Provider: Hannah Rosa MD      Pt provided informed consent for the behavioral health program. Discussed with patient model of service to include the limits of confidentiality (i.e. abuse reporting, suicide intervention, etc.) and short-term intervention focused approach. Pt indicated understanding. Subjective  LIFE CONTEXT   1. Family  With whom do you live? Spouse  Chriss - 2 dogs   or partner? Yes Length of relationship? 7  Children? Yes 3 kids, 5 grand kids  Type of relationships with the people you live with? Fair  Supportive relationships? Daughter and friend    2. Social  Friends? Yes Quality? Good Frequency of contact? daily  Other connection with community? no    3. Work/School  Work/go to school? Employed full time [employer]  How many years in this job? 23 How are you doing? Good  How do you support yourself financially? work    4. Recreation  For fun? Relaxation? Yard work, time with friends, some tv   How often? daily    5. Self-Care  Exercise on a regular basis for health? Occasional  What type of exercise and how often? walking  several times per week - 20 minutes  Sleep ok? up frequently at night  Eat ok? Good  Use tobacco (what and how often)? denies  Use caffeine (what and how often)? 1  cups of coffee per day  Use alcohol (what and how often)? 1-2 glasses of wine per 5 days per week   Use drugs (what and how often)? No current use  Problems in the past with tobacco, alcohol or drugs? No  Medications: Take as prescribed?  compliant all of the time    Current Medications:  Current Outpatient Medications   Medication Sig Dispense Refill    medroxyPROGESTERone

## 2023-11-18 ENCOUNTER — TELEPHONE (OUTPATIENT)
Dept: FAMILY MEDICINE CLINIC | Age: 68
End: 2023-11-18

## 2023-11-20 RX ORDER — FEXOFENADINE HCL AND PSEUDOEPHEDRINE HCI 180; 240 MG/1; MG/1
TABLET, EXTENDED RELEASE ORAL
Qty: 90 TABLET | Refills: 3 | Status: SHIPPED | OUTPATIENT
Start: 2023-11-20

## 2023-11-20 NOTE — TELEPHONE ENCOUNTER
Requested Prescriptions     Pending Prescriptions Disp Refills    fexofenadine-pseudoephedrine (ALLEGRA-D ALLERGY & CONGESTION) 180-240 MG per extended release tablet [Pharmacy Med Name: Allegra-D Allergy & Congestion Oral Tablet Extended Release 24 Hour 180-240 MG] 90 tablet 0     Sig: TAKE 1 TABLET BY MOUTH DAILY        Last OV: 10/24/23    Last labs: 5/22/23     F/u: None

## 2024-01-09 NOTE — PROGRESS NOTES
Preoperative Consultation      Trinity Thompson  YOB: 1955    Date of Service:  1/11/2024    Vitals:    01/11/24 1005   BP: 126/84   Site: Right Upper Arm   Position: Sitting   Pulse: 76   Resp: 16   Temp: 98.1 °F (36.7 °C)   TempSrc: Temporal   SpO2: 100%   Weight: 77.7 kg (171 lb 6.4 oz)      Wt Readings from Last 2 Encounters:   01/11/24 77.7 kg (171 lb 6.4 oz)   10/24/23 77.9 kg (171 lb 12.8 oz)     BP Readings from Last 3 Encounters:   01/11/24 126/84   10/24/23 (!) 146/80   05/02/23 136/84        Chief Complaint   Patient presents with    Pre-op Exam     01/18/24 RIGHT CATARACT, LEFT EYE 02/01/24, MARIA LUZI, DR. Stapletno     Allergies   Allergen Reactions    Amoxicillin      Yeast infections    Diclofenac Swelling    Shellfish-Derived Products Hives     Outpatient Medications Marked as Taking for the 1/11/24 encounter (Office Visit) with Mary Jane Bernal MD   Medication Sig Dispense Refill    fexofenadine-pseudoephedrine (ALLEGRA-D ALLERGY & CONGESTION) 180-240 MG per extended release tablet TAKE 1 TABLET BY MOUTH DAILY 90 tablet 3    medroxyPROGESTERone (PROVERA) 2.5 MG tablet Take 1 tablet by mouth daily 90 tablet 0    estrogens, conjugated,-methylTESTOSTERone (EST ESTROGENS-METHYLTEST HS) 0.625-1.25 MG per tablet TAKE 1 TABLET BY MOUTH EVERY DAY (Patient taking differently: 0.5 tablets. TAKE 1 TABLET BY MOUTH EVERY DAY) 84 tablet 1    doxycycline monohydrate (MONODOX) 100 MG capsule 1 po BID. If upsets stomach take with food.  Be careful with sun exposure may cause significant sun burn      acyclovir (ZOVIRAX) 400 MG tablet TAKE 1 TABLET BY MOUTH EVERY 4 HOURS WHILE AWAKE x 5 d 30 tablet 5    EPINEPHrine (EPIPEN 2-QUANG) 0.3 MG/0.3ML SOAJ injection Use as directed for allergic reaction 1 each 1    Multiple Vitamins-Minerals (THERAPEUTIC MULTIVITAMIN-MINERALS) tablet Take 1 tablet by mouth daily         This patient presents to the office today for a preoperative consultation at the request of

## 2024-01-11 ENCOUNTER — OFFICE VISIT (OUTPATIENT)
Age: 69
End: 2024-01-11
Payer: COMMERCIAL

## 2024-01-11 VITALS
WEIGHT: 171.4 LBS | OXYGEN SATURATION: 100 % | TEMPERATURE: 98.1 F | HEART RATE: 76 BPM | DIASTOLIC BLOOD PRESSURE: 84 MMHG | RESPIRATION RATE: 16 BRPM | SYSTOLIC BLOOD PRESSURE: 126 MMHG | BODY MASS INDEX: 30.36 KG/M2

## 2024-01-11 DIAGNOSIS — B00.9 HERPES SIMPLEX: ICD-10-CM

## 2024-01-11 DIAGNOSIS — Z01.818 PREOP EXAMINATION: Primary | ICD-10-CM

## 2024-01-11 DIAGNOSIS — H25.9 AGE-RELATED CATARACT OF BOTH EYES, UNSPECIFIED AGE-RELATED CATARACT TYPE: ICD-10-CM

## 2024-01-11 PROCEDURE — 99214 OFFICE O/P EST MOD 30 MIN: CPT | Performed by: FAMILY MEDICINE

## 2024-01-11 PROCEDURE — 1123F ACP DISCUSS/DSCN MKR DOCD: CPT | Performed by: FAMILY MEDICINE

## 2024-01-11 RX ORDER — ACYCLOVIR 400 MG/1
TABLET ORAL
Qty: 30 TABLET | Refills: 5 | Status: SHIPPED | OUTPATIENT
Start: 2024-01-11

## 2024-01-11 RX ORDER — EPINEPHRINE 0.3 MG/.3ML
INJECTION SUBCUTANEOUS
Qty: 1 EACH | Refills: 1 | Status: SHIPPED | OUTPATIENT
Start: 2024-01-11

## 2024-01-11 ASSESSMENT — PATIENT HEALTH QUESTIONNAIRE - PHQ9
SUM OF ALL RESPONSES TO PHQ QUESTIONS 1-9: 0
2. FEELING DOWN, DEPRESSED OR HOPELESS: 0
SUM OF ALL RESPONSES TO PHQ QUESTIONS 1-9: 0
1. LITTLE INTEREST OR PLEASURE IN DOING THINGS: 0
SUM OF ALL RESPONSES TO PHQ9 QUESTIONS 1 & 2: 0
SUM OF ALL RESPONSES TO PHQ QUESTIONS 1-9: 0
SUM OF ALL RESPONSES TO PHQ QUESTIONS 1-9: 0

## 2024-01-31 DIAGNOSIS — N95.1 MENOPAUSAL SYMPTOM: ICD-10-CM

## 2024-01-31 RX ORDER — MEDROXYPROGESTERONE ACETATE 2.5 MG/1
2.5 TABLET ORAL DAILY
Qty: 90 TABLET | Refills: 2 | Status: SHIPPED | OUTPATIENT
Start: 2024-01-31

## 2024-01-31 NOTE — TELEPHONE ENCOUNTER
Requested Prescriptions     Pending Prescriptions Disp Refills    medroxyPROGESTERone (PROVERA) 2.5 MG tablet [Pharmacy Med Name: medroxyPROGESTERone Acetate Oral Tablet 2.5 MG] 90 tablet 0     Sig: TAKE 1 TABLET BY MOUTH EVERY DAY         Last OV: 1/11/2024    Last labs: 10/24/2023     F/u: Visit date not found

## 2024-04-18 DIAGNOSIS — N95.1 MENOPAUSAL SYMPTOM: ICD-10-CM

## 2024-04-18 NOTE — TELEPHONE ENCOUNTER
Requested Prescriptions     Pending Prescriptions Disp Refills    EST ESTROGENS-METHYLTEST HS 0.625-1.25 MG per tablet [Pharmacy Med Name: Est Estrogens-Methyltest HS Oral Tablet 0.625-1.25 MG] 84 tablet 0     Sig: TAKE 1 TABLET BY MOUTH EVERY DAY        Last OV: 1/11/24    Last labs: 5/2/23     F/u: None

## 2024-04-24 ENCOUNTER — TELEPHONE (OUTPATIENT)
Age: 69
End: 2024-04-24

## 2024-04-24 DIAGNOSIS — R42 VERTIGO: Primary | ICD-10-CM

## 2024-04-24 RX ORDER — MECLIZINE HYDROCHLORIDE 25 MG/1
25 TABLET ORAL 3 TIMES DAILY PRN
Qty: 15 TABLET | Refills: 2 | Status: CANCELLED | OUTPATIENT
Start: 2024-04-24 | End: 2024-05-04

## 2024-04-24 RX ORDER — ONDANSETRON 4 MG/1
4 TABLET, ORALLY DISINTEGRATING ORAL 3 TIMES DAILY PRN
Qty: 21 TABLET | Refills: 0 | Status: CANCELLED | OUTPATIENT
Start: 2024-04-24

## 2024-04-24 NOTE — TELEPHONE ENCOUNTER
VERTIGO- NAUSEA   Tooth Infected- needs to pulled will not be pulled until next Friday     Laying in bed this morning and then rolled her head and body to one side and immediately felt dizzy. Could not walk this am. Is doing better now but dealing with nausea dizziness is better but still on going.     Worse when bending over.     Has taken meclizine this am. Some difference in condition but nothing drastic. Has some left over Zofran. Informed patient she could take to help with nausea.     One previous episode of vertigo and [facility] which [facility]. Are we able to get referral/ referral to help.     Pended refills for meclizine and Zofran  Requested Prescriptions     Pending Prescriptions Disp Refills    meclizine (ANTIVERT) 25 MG tablet 15 tablet 0     Sig: Take 1 tablet by mouth 3 times daily as needed    ondansetron (ZOFRAN-ODT) 4 MG disintegrating tablet 21 tablet 0     Sig: Take 1 tablet by mouth 3 times daily as needed for Nausea or Vomiting     Last OV: 1/11/2024  Last labs: 10/24/2023  F/u: Visit date not found

## 2024-04-26 ENCOUNTER — PATIENT MESSAGE (OUTPATIENT)
Age: 69
End: 2024-04-26

## 2024-04-26 RX ORDER — MECLIZINE HCL 12.5 MG/1
12.5 TABLET ORAL 3 TIMES DAILY PRN
Qty: 15 TABLET | Refills: 0 | Status: SHIPPED | OUTPATIENT
Start: 2024-04-26 | End: 2024-05-06

## 2024-04-26 RX ORDER — ONDANSETRON 4 MG/1
4 TABLET, ORALLY DISINTEGRATING ORAL 3 TIMES DAILY PRN
Qty: 21 TABLET | Refills: 0 | Status: SHIPPED | OUTPATIENT
Start: 2024-04-26

## 2024-04-26 NOTE — TELEPHONE ENCOUNTER
From: Trinity Thompson  To: Dr. Mary Jane Bernal  Sent: 4/26/2024 7:47 AM EDT  Subject: Prescriptions    Good morning, I was told that a prescription for meclizine and zofran would be called into my pharmacy ( [facility] in [place])on Wednesday for my vertigo. I have called several pharmacies and can’t find my prescriptions. I have an apt with the [facility] but it isn’t until April 30.can you please tell me when you will call them in?  Thanks, Esther

## 2024-04-27 ENCOUNTER — OFFICE VISIT (OUTPATIENT)
Dept: URGENT CARE | Age: 69
End: 2024-04-27

## 2024-04-27 VITALS
DIASTOLIC BLOOD PRESSURE: 83 MMHG | HEART RATE: 67 BPM | SYSTOLIC BLOOD PRESSURE: 150 MMHG | OXYGEN SATURATION: 96 % | WEIGHT: 170 LBS | TEMPERATURE: 97.9 F | HEIGHT: 63 IN | BODY MASS INDEX: 30.12 KG/M2

## 2024-04-27 DIAGNOSIS — R09.89 RHONCHI: ICD-10-CM

## 2024-04-27 DIAGNOSIS — R03.0 ELEVATED BLOOD PRESSURE READING WITHOUT DIAGNOSIS OF HYPERTENSION: ICD-10-CM

## 2024-04-27 DIAGNOSIS — R09.82 POSTNASAL DRIP: ICD-10-CM

## 2024-04-27 DIAGNOSIS — J40 BRONCHITIS: ICD-10-CM

## 2024-04-27 DIAGNOSIS — J01.10 ACUTE NON-RECURRENT FRONTAL SINUSITIS: Primary | ICD-10-CM

## 2024-04-27 DIAGNOSIS — R05.1 ACUTE COUGH: ICD-10-CM

## 2024-04-27 DIAGNOSIS — R09.81 NASAL CONGESTION: ICD-10-CM

## 2024-04-27 RX ORDER — PREDNISONE 10 MG/1
10 TABLET ORAL 2 TIMES DAILY
Qty: 10 TABLET | Refills: 0 | Status: SHIPPED | OUTPATIENT
Start: 2024-04-27 | End: 2024-05-02

## 2024-04-27 RX ORDER — DEXTROMETHORPHAN HBR AND PYRILAMINE MALEATE 30; 30 MG/1; MG/1
1 TABLET ORAL 4 TIMES DAILY PRN
Qty: 40 TABLET | Refills: 0 | Status: SHIPPED | OUTPATIENT
Start: 2024-04-27

## 2024-04-27 RX ORDER — BENZONATATE 200 MG/1
200 CAPSULE ORAL 3 TIMES DAILY PRN
Qty: 30 CAPSULE | Refills: 0 | Status: SHIPPED | OUTPATIENT
Start: 2024-04-27 | End: 2024-05-07

## 2024-04-27 RX ORDER — GUAIFENESIN 600 MG/1
600 TABLET, EXTENDED RELEASE ORAL 2 TIMES DAILY
Qty: 20 TABLET | Refills: 0 | Status: SHIPPED | OUTPATIENT
Start: 2024-04-27 | End: 2024-05-07

## 2024-04-27 RX ORDER — AZITHROMYCIN 250 MG/1
TABLET, FILM COATED ORAL
Qty: 6 TABLET | Refills: 0 | Status: SHIPPED | OUTPATIENT
Start: 2024-04-27 | End: 2024-05-07

## 2024-04-27 NOTE — PATIENT INSTRUCTIONS
XR CHEST PA/LAT   Final Result   1. Bronchitis versus reactive airway disease.             Azithromycin is prescribed for antibiotic treatment of the sinus infection  Take the antibiotics until completed, do not stop unless otherwise directed by a healthcare provider.  Recommend daily yogurt or other probiotics while on antibiotics.  Prednisone burst prescribed for relief of the bronchitis  [place] DMT prescribed for cough and congestion relief  Benzonatate prescribed for non-drowsy treatment for the cough.  Guaifenesin (Mucinex) prescribed for expectorant therapy.  Recommend OTC treatment for symptoms:  Ibuprofen (Advil, Motrin) and acetaminophen (Tylenol) for fevers and pain relief.  antihistamines (Claritin, Zyrtec, Allegra, or Xyzal) and nasal steroid sprays (Flonase) to help with nasal congestion and runny nose.  throat sprays (Cepacol, chloraseptic) for throat pain.  dextromethorphan (Robitussin, Delsym), throat lozenges, or honey (1-2 teaspoons every hour) for cough.  warm teas, humidifiers, nasal lavages, and sleeping in an inclined position are also helpful options that can lessen symptoms.    Follow up with your PCP within 5 days or, if unable, you can return to the clinic if symptoms persist beyond 5 days or if symptoms worsen.    New Prescriptions    AZITHROMYCIN (ZITHROMAX) 250 MG TABLET    500mg on day 1 followed by 250mg on days 2 - 5    BENZONATATE (TESSALON) 200 MG CAPSULE    Take 1 capsule by mouth 3 times daily as needed for Cough    DEXTROMETHORPHAN-PYRILAMINE (CAPRON DMT) 30-30 MG TABS    Take 1 tablet by mouth 4 times daily as needed (cough and congestion)    GUAIFENESIN (MUCINEX) 600 MG EXTENDED RELEASE TABLET    Take 1 tablet by mouth 2 times daily for 10 days    PREDNISONE (DELTASONE) 10 MG TABLET    Take 1 tablet by mouth 2 times daily for 5 days         Continue at-home monitoring of BP - recommend keeping a log of your blood pressures to discuss with your PCP.   Follow up with PCP to

## 2024-04-27 NOTE — PROGRESS NOTES
Trinity Thompson (: 1955) is a 69 y.o. female, New patient, here for evaluation of the following chief complaint(s):  Cough (Since Saturday. No fevers. Pt states its keeping her up at night), Headache, and Otalgia      ASSESSMENT/PLAN:    ICD-10-CM    1. Acute non-recurrent frontal sinusitis  J01.10 azithromycin (ZITHROMAX) 250 MG tablet     Dextromethorphan-Pyrilamine (CAPRON DMT) 30-30 MG TABS     guaiFENesin (MUCINEX) 600 MG extended release tablet      2. Bronchitis  J40 predniSONE (DELTASONE) 10 MG tablet     benzonatate (TESSALON) 200 MG capsule     guaiFENesin (MUCINEX) 600 MG extended release tablet      3. Elevated blood pressure reading without diagnosis of hypertension  R03.0 [facility]      4. Rhonchi  R09.89 XR CHEST PA/LAT      5. Nasal congestion  R09.81 Dextromethorphan-Pyrilamine (CAPRON DMT) 30-30 MG TABS     guaiFENesin (MUCINEX) 600 MG extended release tablet      6. Postnasal drip  R09.82 Dextromethorphan-Pyrilamine (CAPRON DMT) 30-30 MG TABS     guaiFENesin (MUCINEX) 600 MG extended release tablet      7. Acute cough  R05.1 Dextromethorphan-Pyrilamine (CAPRON DMT) 30-30 MG TABS     benzonatate (TESSALON) 200 MG capsule     guaiFENesin (MUCINEX) 600 MG extended release tablet          - Sinusitis:  Given symptoms for over a week, with noted worsening of symptoms, including nasal congestion and headaches, concern for bacterial sinusitis complicating an underlying viral URI.  Low concern for otitis media, Strep pharyngitis, respiratory distress, and PE.  Azithromycin prescribed for antibiotic treatment.  [place] DMT prescribed for cough and congestion relief.  Benzonatate prescribed for non-drowsy treatment for the cough.  Guaifenesin (Mucinex) prescribed for expectorant therapy.  Recommended OTC medication and home remedy treatments for symptomatic relief    - Bronchitis:  Given the persistent cough with mild rhonchi noted during

## 2024-04-29 ENCOUNTER — OFFICE VISIT (OUTPATIENT)
Age: 69
End: 2024-04-29
Payer: COMMERCIAL

## 2024-04-29 VITALS
RESPIRATION RATE: 16 BRPM | OXYGEN SATURATION: 96 % | SYSTOLIC BLOOD PRESSURE: 132 MMHG | BODY MASS INDEX: 29.9 KG/M2 | DIASTOLIC BLOOD PRESSURE: 76 MMHG | TEMPERATURE: 97.9 F | HEART RATE: 78 BPM | WEIGHT: 168.8 LBS

## 2024-04-29 DIAGNOSIS — J40 BRONCHITIS: Primary | ICD-10-CM

## 2024-04-29 PROCEDURE — 1123F ACP DISCUSS/DSCN MKR DOCD: CPT | Performed by: FAMILY MEDICINE

## 2024-04-29 PROCEDURE — 99213 OFFICE O/P EST LOW 20 MIN: CPT | Performed by: FAMILY MEDICINE

## 2024-04-29 RX ORDER — HYDROCODONE POLISTIREX AND CHLORPHENIRAMINE POLISTIREX 10; 8 MG/5ML; MG/5ML
5 SUSPENSION, EXTENDED RELEASE ORAL EVERY 12 HOURS PRN
Qty: 70 ML | Refills: 0 | Status: SHIPPED | OUTPATIENT
Start: 2024-04-29 | End: 2024-05-06

## 2024-04-29 NOTE — PROGRESS NOTES
Subjective:      Patient ID: Trinity Thompson 69 y.o. female. is here for evaluation for cough.       HPI    Was in urgent care on Saturday for increased cough and congestion.  Rhinorrhea, pressure in sinuses, ears are clogged.  Nasal discharge is clear but is coughing up brown mucous.  No hemoptysis or fever. No nausea, vomiting.  Diarrhea since on Zpak - 3 - 4 times. No brinda or hematochezia.   The cough is severe and paroxysmal.  Last night was coughing so hard she lost her balance.  Caught herself on the door frame with both arms outstretched.  Has a abrasion on the left elbow and pain in both shoulders, lateral and posterior.  No loss of ROM  She does not feel better yet with the Zpak.   Tessalon perles are not helping her cough.    Outpatient Medications Marked as Taking for the 4/29/24 encounter (Office Visit) with Mary Jane Bernal MD   Medication Sig Dispense Refill    azithromycin (ZITHROMAX) 250 MG tablet 500mg on day 1 followed by 250mg on days 2 - 5 6 tablet 0    predniSONE (DELTASONE) 10 MG tablet Take 1 tablet by mouth 2 times daily for 5 days 10 tablet 0    benzonatate (TESSALON) 200 MG capsule Take 1 capsule by mouth 3 times daily as needed for Cough 30 capsule 0    guaiFENesin (MUCINEX) 600 MG extended release tablet Take 1 tablet by mouth 2 times daily for 10 days 20 tablet 0    ondansetron (ZOFRAN-ODT) 4 MG disintegrating tablet Take 1 tablet by mouth 3 times daily as needed for Nausea or Vomiting 21 tablet 0    meclizine (ANTIVERT) 12.5 MG tablet Take 1 tablet by mouth 3 times daily as needed for Dizziness 15 tablet 0    estrogens, conjugated,-methylTESTOSTERone (EST ESTROGENS-METHYLTEST HS) 0.625-1.25 MG per tablet TAKE 1 TABLET BY MOUTH EVERY DAY 84 tablet 1    medroxyPROGESTERone (PROVERA) 2.5 MG tablet TAKE 1 TABLET BY MOUTH EVERY DAY 90 tablet 2    fexofenadine-pseudoephedrine (ALLEGRA-D ALLERGY & CONGESTION) 180-240 MG per extended release tablet TAKE 1 TABLET BY MOUTH DAILY 90

## 2024-05-02 ENCOUNTER — PATIENT MESSAGE (OUTPATIENT)
Age: 69
End: 2024-05-02

## 2024-05-02 NOTE — TELEPHONE ENCOUNTER
From: Trinity Thompson  To: Dr. Mary Jane Bernal  Sent: 5/2/2024 2:58 PM EDT  Subject: Still not feeling lots better    Good afternoon,   You told me to let you know if I wasn’t improving. I feel my cough is still deep and often, still coughing up phlegm. I am blowing out colored mucus instead of the clear when I was in your office. My ears are still very clogged and I sound more congested to my coworkers. Thoughts?  Esther

## 2024-05-03 RX ORDER — DOXYCYCLINE HYCLATE 100 MG
100 TABLET ORAL 2 TIMES DAILY
Qty: 14 TABLET | Refills: 0 | Status: SHIPPED | OUTPATIENT
Start: 2024-05-03 | End: 2024-05-10

## 2024-07-24 ENCOUNTER — TELEPHONE (OUTPATIENT)
Dept: ADMINISTRATIVE | Age: 69
End: 2024-07-24

## 2024-07-29 ENCOUNTER — NURSE ONLY (OUTPATIENT)
Age: 69
End: 2024-07-29
Payer: COMMERCIAL

## 2024-07-29 DIAGNOSIS — Z23 NEED FOR VACCINATION: Primary | ICD-10-CM

## 2024-07-29 PROCEDURE — 90632 HEPA VACCINE ADULT IM: CPT | Performed by: FAMILY MEDICINE

## 2024-07-29 PROCEDURE — 90471 IMMUNIZATION ADMIN: CPT | Performed by: FAMILY MEDICINE

## 2024-07-29 NOTE — PROGRESS NOTES
Immunizations Administered       Name Date Dose Route    Hep A, HAVRIX, VAQTA, (age 19y+), IM, 1mL 7/29/2024 1 mL Intramuscular    Site: Deltoid- Left    Lot: [id number]    NDC: 42726-805-41        SMS

## 2024-09-12 ENCOUNTER — PATIENT MESSAGE (OUTPATIENT)
Age: 69
End: 2024-09-12

## 2024-09-12 RX ORDER — SCOLOPAMINE TRANSDERMAL SYSTEM 1 MG/1
1 PATCH, EXTENDED RELEASE TRANSDERMAL
Qty: 8 PATCH | Refills: 1 | Status: SHIPPED | OUTPATIENT
Start: 2024-09-12

## 2024-10-14 DIAGNOSIS — N95.1 MENOPAUSAL SYMPTOM: ICD-10-CM

## 2024-10-14 RX ORDER — MEDROXYPROGESTERONE ACETATE 2.5 MG/1
2.5 TABLET ORAL DAILY
Qty: 90 TABLET | Refills: 0 | Status: SHIPPED | OUTPATIENT
Start: 2024-10-14

## 2024-10-14 NOTE — TELEPHONE ENCOUNTER
Requested Prescriptions     Pending Prescriptions Disp Refills    medroxyPROGESTERone (PROVERA) 2.5 MG tablet [Pharmacy Med Name: medroxyPROGESTERone Acetate Oral Tablet 2.5 MG] 90 tablet 0     Sig: TAKE 1 TABLET BY MOUTH EVERY DAY         Last OV: 4/29/2024    Last labs: 10/24/2023     F/u: Visit date not found

## 2024-11-25 RX ORDER — FEXOFENADINE HCL AND PSEUDOEPHEDRINE HCL 180; 240 MG/1; MG/1
TABLET, EXTENDED RELEASE ORAL
Qty: 90 TABLET | Refills: 0 | OUTPATIENT
Start: 2024-11-25

## 2024-12-02 SDOH — ECONOMIC STABILITY: FOOD INSECURITY: WITHIN THE PAST 12 MONTHS, YOU WORRIED THAT YOUR FOOD WOULD RUN OUT BEFORE YOU GOT MONEY TO BUY MORE.: NEVER TRUE

## 2024-12-02 SDOH — ECONOMIC STABILITY: FOOD INSECURITY: WITHIN THE PAST 12 MONTHS, THE FOOD YOU BOUGHT JUST DIDN'T LAST AND YOU DIDN'T HAVE MONEY TO GET MORE.: NEVER TRUE

## 2024-12-02 SDOH — HEALTH STABILITY: PHYSICAL HEALTH: ON AVERAGE, HOW MANY MINUTES DO YOU ENGAGE IN EXERCISE AT THIS LEVEL?: 20 MIN

## 2024-12-02 SDOH — HEALTH STABILITY: PHYSICAL HEALTH: ON AVERAGE, HOW MANY DAYS PER WEEK DO YOU ENGAGE IN MODERATE TO STRENUOUS EXERCISE (LIKE A BRISK WALK)?: 6 DAYS

## 2024-12-02 SDOH — ECONOMIC STABILITY: TRANSPORTATION INSECURITY
IN THE PAST 12 MONTHS, HAS LACK OF TRANSPORTATION KEPT YOU FROM MEETINGS, WORK, OR FROM GETTING THINGS NEEDED FOR DAILY LIVING?: NO

## 2024-12-02 SDOH — ECONOMIC STABILITY: INCOME INSECURITY: HOW HARD IS IT FOR YOU TO PAY FOR THE VERY BASICS LIKE FOOD, HOUSING, MEDICAL CARE, AND HEATING?: NOT HARD AT ALL

## 2024-12-02 ASSESSMENT — PATIENT HEALTH QUESTIONNAIRE - PHQ9
SUM OF ALL RESPONSES TO PHQ9 QUESTIONS 1 & 2: 0
SUM OF ALL RESPONSES TO PHQ QUESTIONS 1-9: 0
2. FEELING DOWN, DEPRESSED OR HOPELESS: NOT AT ALL
SUM OF ALL RESPONSES TO PHQ QUESTIONS 1-9: 0
SUM OF ALL RESPONSES TO PHQ QUESTIONS 1-9: 0
1. LITTLE INTEREST OR PLEASURE IN DOING THINGS: NOT AT ALL
SUM OF ALL RESPONSES TO PHQ QUESTIONS 1-9: 0

## 2024-12-02 ASSESSMENT — LIFESTYLE VARIABLES
HOW MANY STANDARD DRINKS CONTAINING ALCOHOL DO YOU HAVE ON A TYPICAL DAY: 1
HOW OFTEN DO YOU HAVE A DRINK CONTAINING ALCOHOL: 4
HOW OFTEN DO YOU HAVE A DRINK CONTAINING ALCOHOL: 2-3 TIMES A WEEK
HOW OFTEN DO YOU HAVE SIX OR MORE DRINKS ON ONE OCCASION: 1
HOW MANY STANDARD DRINKS CONTAINING ALCOHOL DO YOU HAVE ON A TYPICAL DAY: 1 OR 2

## 2024-12-03 ENCOUNTER — OFFICE VISIT (OUTPATIENT)
Age: 69
End: 2024-12-03
Payer: MEDICARE

## 2024-12-03 VITALS
BODY MASS INDEX: 29.82 KG/M2 | HEIGHT: 63 IN | DIASTOLIC BLOOD PRESSURE: 74 MMHG | OXYGEN SATURATION: 98 % | WEIGHT: 168.3 LBS | TEMPERATURE: 97.2 F | SYSTOLIC BLOOD PRESSURE: 130 MMHG | HEART RATE: 86 BPM | RESPIRATION RATE: 16 BRPM

## 2024-12-03 DIAGNOSIS — E28.39 ESTROGEN DEFICIENCY: ICD-10-CM

## 2024-12-03 DIAGNOSIS — Z00.00 WELCOME TO MEDICARE PREVENTIVE VISIT: Primary | ICD-10-CM

## 2024-12-03 PROCEDURE — G0402 INITIAL PREVENTIVE EXAM: HCPCS | Performed by: FAMILY MEDICINE

## 2024-12-03 PROCEDURE — 1123F ACP DISCUSS/DSCN MKR DOCD: CPT | Performed by: FAMILY MEDICINE

## 2024-12-03 PROCEDURE — 1159F MED LIST DOCD IN RCRD: CPT | Performed by: FAMILY MEDICINE

## 2024-12-03 PROCEDURE — 1160F RVW MEDS BY RX/DR IN RCRD: CPT | Performed by: FAMILY MEDICINE

## 2024-12-03 RX ORDER — CALCIUM CARB, CITRATE/VIT D3 600MG-12.5
1 TABLET, EXTENDED RELEASE ORAL DAILY
COMMUNITY
Start: 2024-12-03

## 2024-12-03 NOTE — PATIENT INSTRUCTIONS
You need 1200 mg of calcium a day and Vitamin D 800 to 2000 IU once a day.  You cannot absorb more than 600 mg of calcium at a time, so you have to split this into 2 doses.    You can get calcium from diet (milk has 300 mg calcium per 8 ounces, yogurt has 150 mg per serving and cheese has 100 mg per ounce, leafy green vegetables have 50 mg per cup; many foods such as cereals, granola bars and juice are supplemented with calcium.  Read labels to see how much you are getting). If you are not getting enough calcium in your diet, you can take calcium and Vitamin D supplements that are available over-the-counter.  I recommend calcium citrate - it is absorbed more easily than calcium carbonate.      Citracal makes a slow release formulation that you can take once a day.        A Healthy Heart: Care Instructions  Overview     Coronary artery disease, also called heart disease, occurs when a substance called plaque builds up in the vessels that supply oxygen-rich blood to your heart muscle. This can narrow the blood vessels and reduce blood flow. A heart attack happens when blood flow is completely blocked. A high-fat diet, smoking, and other factors increase the risk of heart disease.  Your doctor has found that you have a chance of having heart disease. A heart-healthy lifestyle can help keep your heart healthy and prevent heart disease. This lifestyle includes eating healthy, being active, staying at a weight that's healthy for you, and not smoking or using tobacco. It also includes taking medicines as directed, managing other health conditions, and trying to get a healthy amount of sleep.  Follow-up care is a key part of your treatment and safety. Be sure to make and go to all appointments, and call your doctor if you are having problems. It's also a good idea to know your test results and keep a list of the medicines you take.  How can you care for yourself at home?  Diet    Use less salt when you cook and eat. This

## 2024-12-03 NOTE — PROGRESS NOTES
BID. If upsets stomach take with food.  Be careful with sun exposure may cause significant sun burn Yes Provider, MD Dana   EPINEPHrine (EPIPEN 2-QUANG) 0.3 MG/0.3ML SOAJ injection Use as directed for allergic reaction  Patient not taking: Reported on 4/29/2024  Mary Jane Bernal MD   Multiple Vitamins-Minerals (THERAPEUTIC MULTIVITAMIN-MINERALS) tablet Take 1 tablet by mouth daily  Patient not taking: Reported on 12/3/2024  ProviderDana MD       [facility] (Including outside providers/suppliers regularly involved in providing care):   Patient Care Team:  Mary Jane Bernal MD as PCP - General  Mary Jane Bernal MD as PCP - Empaneled Provider      Reviewed and updated this visit:  Tobacco  Allergies  Meds  Problems  Med Hx  Surg Hx  Soc Hx  Fam Hx

## 2024-12-05 DIAGNOSIS — N95.1 MENOPAUSAL SYMPTOM: ICD-10-CM

## 2024-12-05 RX ORDER — FEXOFENADINE HCL AND PSEUDOEPHEDRINE HCL 180; 240 MG/1; MG/1
TABLET, EXTENDED RELEASE ORAL
Qty: 90 TABLET | Refills: 3 | Status: SHIPPED | OUTPATIENT
Start: 2024-12-05

## 2024-12-05 RX ORDER — MEDROXYPROGESTERONE ACETATE 2.5 MG/1
2.5 TABLET ORAL DAILY
Qty: 90 TABLET | Refills: 3 | Status: SHIPPED | OUTPATIENT
Start: 2024-12-05

## 2024-12-05 NOTE — TELEPHONE ENCOUNTER
Requested Prescriptions     Pending Prescriptions Disp Refills    medroxyPROGESTERone (PROVERA) 2.5 MG tablet 90 tablet 0     Sig: Take 1 tablet by mouth daily    fexofenadine-pseudoephedrine (ALLEGRA-D ALLERGY & CONGESTION) 180-240 MG per extended release tablet 90 tablet 3     Sig: TAKE 1 TABLET BY MOUTH DAILY         Last OV: 12/3/2024    Last labs: 10/24/2023     F/u: 3/3/2025

## 2024-12-05 NOTE — TELEPHONE ENCOUNTER
Patient was seen in office 12/3     Needs a refills on all current medications she says     She specifecilly said medroxyPROGESTERone (PROVERA) 2.5 MG tablet     And ALLEGRA-D ALLERGY & CONGESTION 180-240 MG per extended release tablet

## 2025-01-02 ENCOUNTER — HOSPITAL ENCOUNTER (OUTPATIENT)
Dept: GENERAL RADIOLOGY | Age: 70
Discharge: HOME | End: 2025-01-02
Payer: MEDICARE

## 2025-01-02 DIAGNOSIS — E28.39 ESTROGEN DEFICIENCY: ICD-10-CM

## 2025-01-02 PROCEDURE — 77080 DXA BONE DENSITY AXIAL: CPT

## 2025-02-18 NOTE — PROGRESS NOTES
Side effects of current medications reviewed and questions answered.   Follow up in 3 months or prn.        160

## 2025-03-03 ENCOUNTER — OFFICE VISIT (OUTPATIENT)
Age: 70
End: 2025-03-03

## 2025-03-03 VITALS
TEMPERATURE: 97.2 F | OXYGEN SATURATION: 98 % | RESPIRATION RATE: 16 BRPM | WEIGHT: 171.1 LBS | HEART RATE: 74 BPM | SYSTOLIC BLOOD PRESSURE: 122 MMHG | DIASTOLIC BLOOD PRESSURE: 68 MMHG | BODY MASS INDEX: 30.31 KG/M2

## 2025-03-03 DIAGNOSIS — N95.1 MENOPAUSAL SYMPTOM: ICD-10-CM

## 2025-03-03 DIAGNOSIS — B00.9 HERPES SIMPLEX: ICD-10-CM

## 2025-03-03 DIAGNOSIS — Z78.9 PARTICIPANT IN HEALTH AND WELLNESS PLAN: Primary | ICD-10-CM

## 2025-03-03 RX ORDER — ESTERIFIED ESTROGENS AND METHYLTESTOSTERONE .625; 1.25 MG/1; MG/1
TABLET ORAL
Qty: 84 TABLET | Refills: 1 | Status: SHIPPED | OUTPATIENT
Start: 2025-03-03

## 2025-03-03 RX ORDER — DOXYCYCLINE HYCLATE 100 MG
100 TABLET ORAL DAILY
Qty: 60 TABLET | Refills: 0 | Status: SHIPPED | OUTPATIENT
Start: 2025-03-03 | End: 2025-05-02

## 2025-03-03 RX ORDER — ACYCLOVIR 400 MG/1
TABLET ORAL
Qty: 30 TABLET | Refills: 5 | Status: SHIPPED | OUTPATIENT
Start: 2025-03-03

## 2025-03-03 SDOH — ECONOMIC STABILITY: FOOD INSECURITY: WITHIN THE PAST 12 MONTHS, THE FOOD YOU BOUGHT JUST DIDN'T LAST AND YOU DIDN'T HAVE MONEY TO GET MORE.: NEVER TRUE

## 2025-03-03 SDOH — ECONOMIC STABILITY: INCOME INSECURITY: IN THE LAST 12 MONTHS, WAS THERE A TIME WHEN YOU WERE NOT ABLE TO PAY THE MORTGAGE OR RENT ON TIME?: NO

## 2025-03-03 SDOH — ECONOMIC STABILITY: FOOD INSECURITY: WITHIN THE PAST 12 MONTHS, YOU WORRIED THAT YOUR FOOD WOULD RUN OUT BEFORE YOU GOT MONEY TO BUY MORE.: NEVER TRUE

## 2025-03-03 SDOH — ECONOMIC STABILITY: TRANSPORTATION INSECURITY
IN THE PAST 12 MONTHS, HAS THE LACK OF TRANSPORTATION KEPT YOU FROM MEDICAL APPOINTMENTS OR FROM GETTING MEDICATIONS?: NO

## 2025-03-03 ASSESSMENT — PATIENT HEALTH QUESTIONNAIRE - PHQ9
1. LITTLE INTEREST OR PLEASURE IN DOING THINGS: NOT AT ALL
SUM OF ALL RESPONSES TO PHQ QUESTIONS 1-9: 0
2. FEELING DOWN, DEPRESSED OR HOPELESS: NOT AT ALL
SUM OF ALL RESPONSES TO PHQ QUESTIONS 1-9: 0

## 2025-03-04 ENCOUNTER — PATIENT MESSAGE (OUTPATIENT)
Age: 70
End: 2025-03-04

## 2025-03-04 DIAGNOSIS — E66.09 CLASS 1 OBESITY DUE TO EXCESS CALORIES WITHOUT SERIOUS COMORBIDITY WITH BODY MASS INDEX (BMI) OF 30.0 TO 30.9 IN ADULT: Primary | ICD-10-CM

## 2025-03-04 DIAGNOSIS — E66.811 CLASS 1 OBESITY DUE TO EXCESS CALORIES WITHOUT SERIOUS COMORBIDITY WITH BODY MASS INDEX (BMI) OF 30.0 TO 30.9 IN ADULT: Primary | ICD-10-CM

## 2025-03-04 RX ORDER — TIRZEPATIDE 2.5 MG/.5ML
2.5 INJECTION, SOLUTION SUBCUTANEOUS WEEKLY
Qty: 2 ML | Refills: 0 | Status: SHIPPED | OUTPATIENT
Start: 2025-03-04

## 2025-03-04 RX ORDER — TIRZEPATIDE 2.5 MG/.5ML
2 INJECTION, SOLUTION SUBCUTANEOUS WEEKLY
Qty: 2 ML | Refills: 0 | Status: SHIPPED | OUTPATIENT
Start: 2025-03-04 | End: 2025-03-04 | Stop reason: SDUPTHER

## 2025-03-04 NOTE — TELEPHONE ENCOUNTER
Summary: Inject 0.4 mLs into the skin once a week, Disp-2 mL, R-0    Needs 0.5 ml  Requested Prescriptions     Pending Prescriptions Disp Refills    tirzepatide-weight management (ZEPBOUND) 2.5 MG/0.5ML SOLN subCUTAneous injection (VIAL) 2 mL 0     Sig: Inject 0.5 mLs into the skin once a week     Signed Prescriptions Disp Refills    tirzepatide-weight management (ZEPBOUND) 2.5 MG/0.5ML SOLN subCUTAneous injection (VIAL) 2 mL 0     Sig: Inject 0.4 mLs into the skin once a week     Authorizing Provider: FRANKIE FAY         Last OV: 3/3/2025    Last labs: 10/24/2023     F/u: 6/3/2025

## 2025-03-05 ENCOUNTER — PATIENT MESSAGE (OUTPATIENT)
Age: 70
End: 2025-03-05

## 2025-03-05 ENCOUNTER — TELEPHONE (OUTPATIENT)
Dept: ADMINISTRATIVE | Age: 70
End: 2025-03-05

## 2025-03-05 DIAGNOSIS — Z23 NEED FOR VACCINATION: Primary | ICD-10-CM

## 2025-03-05 NOTE — TELEPHONE ENCOUNTER
Submitted PA for estrogens, conjugated,-methylTESTOSTERone (EST ESTROGENS-METHYLTEST HS) 0.625-1.25 MG per tablet   Via CMM (Key: [id number]) STATUS: ARCHIVED.    The medication you have requested is not covered by Medicare Part D Law     If this requires a response please respond to the pool ( P MHCX PSC MEDICATION PRE-AUTH).      Thank you please advise patient.

## 2025-04-09 ENCOUNTER — PATIENT MESSAGE (OUTPATIENT)
Age: 70
End: 2025-04-09

## 2025-04-09 NOTE — TELEPHONE ENCOUNTER
Requested Prescriptions     Pending Prescriptions Disp Refills    tirzepatide-weight management (ZEPBOUND) 5 MG/0.5ML SOLN subCUTAneous injection (VIAL) 2 mL 0     Sig: Inject 0.5 mLs into the skin once a week         Last OV: 3/3/2025    Last labs: 10/24/2023     F/u: 6/3/2025

## 2025-04-25 DIAGNOSIS — E66.811 CLASS 1 OBESITY DUE TO EXCESS CALORIES WITHOUT SERIOUS COMORBIDITY WITH BODY MASS INDEX (BMI) OF 30.0 TO 30.9 IN ADULT: Primary | ICD-10-CM

## 2025-04-25 DIAGNOSIS — E66.09 CLASS 1 OBESITY DUE TO EXCESS CALORIES WITHOUT SERIOUS COMORBIDITY WITH BODY MASS INDEX (BMI) OF 30.0 TO 30.9 IN ADULT: Primary | ICD-10-CM

## 2025-04-25 RX ORDER — TIRZEPATIDE 7.5 MG/.5ML
7.5 INJECTION, SOLUTION SUBCUTANEOUS
Qty: 2 ML | Refills: 0 | Status: SHIPPED | OUTPATIENT
Start: 2025-04-25

## 2025-04-25 NOTE — TELEPHONE ENCOUNTER
Spoke with pt:    She is tolerating current dose well.    She would move up in dosage if you would like to at this time.    Going through 99 Fahrenheit.    Requested Prescriptions     Pending Prescriptions Disp Refills    ZEPBOUND 5 MG/0.5ML SOLN subCUTAneous injection (VIAL) [Pharmacy Med Name: ZEPBOUND 5 MG/0.5ML SUBCUTANEOUS SOLUTION] 2 mL 0     Sig: INJECT 0.5 ML (5 MG) UNDER THE SKIN ONCE WEEKLY (0.5ML= 50 UNITS)        Last Visit: 3/3/25    Last Labs: 10/24/24    Next Visit: 6/3/25

## 2025-05-14 DIAGNOSIS — E66.09 CLASS 1 OBESITY DUE TO EXCESS CALORIES WITHOUT SERIOUS COMORBIDITY WITH BODY MASS INDEX (BMI) OF 30.0 TO 30.9 IN ADULT: ICD-10-CM

## 2025-05-14 DIAGNOSIS — E66.811 CLASS 1 OBESITY DUE TO EXCESS CALORIES WITHOUT SERIOUS COMORBIDITY WITH BODY MASS INDEX (BMI) OF 30.0 TO 30.9 IN ADULT: ICD-10-CM

## 2025-05-14 NOTE — TELEPHONE ENCOUNTER
Spoke with pt:    Pt is experiencing skin sensitivity recently. Not painful but very sensitive to clothing which she has not felt before.    If it is OK to increase dosage at this time, pt would be agreeable. Currently at the 7.5 MG.

## 2025-08-04 DIAGNOSIS — E66.811 CLASS 1 OBESITY DUE TO EXCESS CALORIES WITHOUT SERIOUS COMORBIDITY WITH BODY MASS INDEX (BMI) OF 30.0 TO 30.9 IN ADULT: ICD-10-CM

## 2025-08-04 DIAGNOSIS — E66.09 CLASS 1 OBESITY DUE TO EXCESS CALORIES WITHOUT SERIOUS COMORBIDITY WITH BODY MASS INDEX (BMI) OF 30.0 TO 30.9 IN ADULT: ICD-10-CM

## 2025-08-04 RX ORDER — TIRZEPATIDE 12.5 MG/.5ML
12.5 INJECTION, SOLUTION SUBCUTANEOUS WEEKLY
Qty: 2 ML | Refills: 0 | Status: SHIPPED | OUTPATIENT
Start: 2025-08-04

## 2025-08-28 DIAGNOSIS — E66.09 CLASS 1 OBESITY DUE TO EXCESS CALORIES WITHOUT SERIOUS COMORBIDITY WITH BODY MASS INDEX (BMI) OF 30.0 TO 30.9 IN ADULT: ICD-10-CM

## 2025-08-28 DIAGNOSIS — E66.811 CLASS 1 OBESITY DUE TO EXCESS CALORIES WITHOUT SERIOUS COMORBIDITY WITH BODY MASS INDEX (BMI) OF 30.0 TO 30.9 IN ADULT: ICD-10-CM

## 2025-08-28 RX ORDER — TIRZEPATIDE 12.5 MG/.5ML
12.5 INJECTION, SOLUTION SUBCUTANEOUS WEEKLY
Qty: 2 ML | Refills: 0 | Status: CANCELLED | OUTPATIENT
Start: 2025-08-28